# Patient Record
Sex: MALE | Race: WHITE | NOT HISPANIC OR LATINO | Employment: FULL TIME | URBAN - METROPOLITAN AREA
[De-identification: names, ages, dates, MRNs, and addresses within clinical notes are randomized per-mention and may not be internally consistent; named-entity substitution may affect disease eponyms.]

---

## 2017-11-27 ENCOUNTER — ALLIED HEALTH/NURSE VISIT (OUTPATIENT)
Dept: NURSING | Facility: CLINIC | Age: 32
End: 2017-11-27
Payer: COMMERCIAL

## 2017-11-27 DIAGNOSIS — Z23 NEED FOR PROPHYLACTIC VACCINATION AND INOCULATION AGAINST INFLUENZA: Primary | ICD-10-CM

## 2017-11-27 PROCEDURE — 90686 IIV4 VACC NO PRSV 0.5 ML IM: CPT

## 2017-11-27 PROCEDURE — 90471 IMMUNIZATION ADMIN: CPT

## 2017-11-27 PROCEDURE — 99207 ZZC NO CHARGE NURSE ONLY: CPT

## 2017-11-27 NOTE — PROGRESS NOTES

## 2017-11-27 NOTE — MR AVS SNAPSHOT
"              After Visit Summary   2017    Wiliam Kellogg    MRN: [id number]           Patient Information     Date Of Birth          1985        Visit Information        Provider Department      2017 4:00 PM CR SILVER MA/LPN [facility]        Today's Diagnoses     Need for prophylactic vaccination and inoculation against influenza    -  1       Follow-ups after your visit        Who to contact     If you have questions or need follow up information about today's clinic visit or your schedule please contact [facility] directly at [phone].  Normal or non-critical lab and imaging results will be communicated to you by Netgenhart, letter or phone within 4 business days after the clinic has received the results. If you do not hear from us within 7 days, please contact the clinic through [employer] or phone. If you have a critical or abnormal lab result, we will notify you by phone as soon as possible.  Submit refill requests through Loom or call your pharmacy and they will forward the refill request to us. Please allow 3 business days for your refill to be completed.          Additional Information About Your Visit        MyChart Information     Loom lets you send messages to your doctor, view your test results, renew your prescriptions, schedule appointments and more. To sign up, go to [email or web address] . Click on \"Log in\" on the left side of the screen, which will take you to the Welcome page. Then click on \"Sign up Now\" on the right side of the page.     You will be asked to enter the access code listed below, as well as some personal information. Please follow the directions to create your username and password.     Your access code is: [email or web address]  Expires: 2018  4:14 PM     Your access code will  in 90 days. If you need help or a new code, please call your [facility] or [phone].        Care EveryWhere ID     This is your Care " EveryWhere ID. This could be used by other organizations to access your [place] medical records  [id number]         Blood Pressure from Last 3 Encounters:   06/23/16 120/78   05/25/16 136/88   05/24/16 139/85    Weight from Last 3 Encounters:   06/23/16 203 lb (92.1 kg)   05/25/16 203 lb (92.1 kg)   05/24/16 203 lb 6.4 oz (92.3 kg)              We Performed the Following     FLU VAC, SPLIT VIRUS IM > 3 YO (QUADRIVALENT) [97251]     Vaccine Administration, Initial [[id number]]        Primary Care Provider Office Phone # Fax #    Louann Shaheed Roldan [phone] [id number]  [facility] [id number]        Equal Access to Services     ALONSO PELAYO : Hadii ajit matoso Soclair, waaxda luqadaha, qaybta kaalmada adeegyada, li campoverde . So [facility] [phone].    ATENCIÓN: Si habla español, tiene a khan disposición servicios gratuitos de asistencia lingüística. Llame al [phone].    We comply with applicable federal civil rights laws and [place] laws. We do not discriminate on the basis of race, color, national origin, age, disability, sex, sexual orientation, or gender identity.            Thank you!     Thank you for choosing [facility]  for your care. Our goal is always to provide you with excellent care. Hearing back from our patients is one way we can continue to improve our services. Please take a few minutes to complete the written survey that you may receive in the mail after your visit with us. Thank you!             Your Updated Medication List - Protect others around you: Learn how to safely use, store and throw away your medicines at www.disposemymeds.org.          This list is accurate as of: 11/27/17  4:14 PM.  Always use your most recent med list.                   Brand Name Dispense Instructions for use Diagnosis    ketoconazole 2 % cream    NIZORAL    30 g    Apply topically 2 times daily    Seborrheic dermatitis        sulfamethoxazole-trimethoprim 800-160 MG per tablet    BACTRIM DS/SEPTRA DS    60 tablet    Take 1 tablet by mouth 2 times daily    Acne vulgaris       tretinoin 0.05 % cream    RETIN-A    45 g    Apply topically At Bedtime    Acne vulgaris

## 2019-01-25 ENCOUNTER — ALLIED HEALTH/NURSE VISIT (OUTPATIENT)
Dept: NURSING | Facility: CLINIC | Age: 34
End: 2019-01-25
Payer: COMMERCIAL

## 2019-01-25 DIAGNOSIS — Z23 NEED FOR PROPHYLACTIC VACCINATION AND INOCULATION AGAINST INFLUENZA: Primary | ICD-10-CM

## 2019-01-25 PROCEDURE — 90471 IMMUNIZATION ADMIN: CPT

## 2019-01-25 PROCEDURE — 99207 ZZC NO CHARGE NURSE ONLY: CPT

## 2019-01-25 PROCEDURE — 90686 IIV4 VACC NO PRSV 0.5 ML IM: CPT

## 2019-01-25 NOTE — PROGRESS NOTES

## 2019-11-04 ENCOUNTER — ALLIED HEALTH/NURSE VISIT (OUTPATIENT)
Dept: FAMILY MEDICINE | Facility: CLINIC | Age: 34
End: 2019-11-04
Payer: COMMERCIAL

## 2019-11-04 DIAGNOSIS — Z23 NEED FOR PROPHYLACTIC VACCINATION AND INOCULATION AGAINST INFLUENZA: Primary | ICD-10-CM

## 2019-11-04 PROCEDURE — 90686 IIV4 VACC NO PRSV 0.5 ML IM: CPT

## 2019-11-04 PROCEDURE — 99207 ZZC NO CHARGE NURSE ONLY: CPT

## 2019-11-04 PROCEDURE — 90471 IMMUNIZATION ADMIN: CPT

## 2020-06-18 ENCOUNTER — OFFICE VISIT (OUTPATIENT)
Dept: FAMILY MEDICINE | Facility: CLINIC | Age: 35
End: 2020-06-18
Payer: COMMERCIAL

## 2020-06-18 VITALS
WEIGHT: 205 LBS | OXYGEN SATURATION: 98 % | HEART RATE: 80 BPM | DIASTOLIC BLOOD PRESSURE: 82 MMHG | SYSTOLIC BLOOD PRESSURE: 155 MMHG | RESPIRATION RATE: 16 BRPM | TEMPERATURE: 97.9 F | BODY MASS INDEX: 29.41 KG/M2
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
## 2020-06-18 PROCEDURE — 99203 OFFICE O/P NEW LOW 30 MIN: CPT | Performed by: PHYSICIAN ASSISTANT

## 2020-06-18 NOTE — PROGRESS NOTES
Subjective     Wiliam Kellogg is a 34 year old male who presents to clinic today for the following health issues:    HPI   Concern - Tuesday night- cat bite  Onset: 3 days     Description:   Swollen, red, right hand- one puncture wound. It is his cat who is an indoor cat and up to date on vaccines.     Intensity: 1/10    Progression of Symptoms: worsening- redness is expanding.     Previous history of similar problem:   Yes     Alleviating factors:  Improved by: peroxide      Patient Active Problem List   Diagnosis     CARDIOVASCULAR SCREENING; LDL GOAL LESS THAN 160     Acne vulgaris     History of isotretinoin therapy     Past Surgical History:   Procedure Laterality Date     C ANESTH,REPAIR UP HERNIA OMPHALOCELE  1988     C REPAIR NONUNION RADIUS AND ULNA  1991    Frx due to trauma, ORIF ulna and radius L arm     wisdom teeth extraction         Social History     Tobacco Use     Smoking status: Never Smoker     Smokeless tobacco: Never Used   Substance Use Topics     Alcohol use: Yes     Comment: occ     Family History   Problem Relation Age of Onset     Neurologic Disorder Mother         migraines     Family History Negative Father      Family History Negative Sister          Current Outpatient Medications   Medication Sig Dispense Refill     amoxicillin-clavulanate (AUGMENTIN) 875-125 MG tablet Take 1 tablet by mouth 2 times daily for 10 days 20 tablet 0     ketoconazole (NIZORAL) 2 % cream Apply topically 2 times daily 30 g 1     sulfamethoxazole-trimethoprim (BACTRIM DS,SEPTRA DS) 800-160 MG per tablet Take 1 tablet by mouth 2 times daily 60 tablet 3     tretinoin (RETIN-A) 0.05 % cream Apply topically At Bedtime 45 g 3     Allergies   Allergen Reactions     No Known Drug Allergies        Reviewed and updated as needed this visit by Provider         Review of Systems   Constitutional, HEENT, cardiovascular, pulmonary, gi and gu systems are negative, except as otherwise noted.        Objective    BP (!)  155/82 (BP Location: Right arm, Patient Position: Sitting, Cuff Size: Adult Large)   Pulse 80   Temp 97.9  F (36.6  C) (Oral)   Resp 16   Wt 93 kg (205 lb)   SpO2 98%   BMI 29.41 kg/m    Body mass index is 29.41 kg/m .         Physical Exam   GENERAL: healthy, alert and no distress  EYES: Eyes grossly normal to inspection, PERRL and conjunctivae and sclerae normal  MS: no gross musculoskeletal defects noted, no edema  SKIN: no suspicious lesions or rashes  NEURO: Normal strength and tone, mentation intact and speech normal  PSYCH: mentation appears normal, affect normal/bright  Right hand: There is a single puncture wound on dorsal side of the hand near distal 1st metacarpal. There is mild swelling in this area. Surrounding erythema is present and about 5-6 cm in diameter. Tender to palpation surrounding the puncture wound.    Diagnostic Test Results:  none         Assessment & Plan     (W55.01XA) Cat bite, initial encounter  (primary encounter diagnosis)    Comment: Infected. Treat with augmentin. Monitor closely and if worsening, call the clinic right away.    Plan: amoxicillin-clavulanate (AUGMENTIN) 875-125 MG         tablet                 Patient Instructions     Patient Education     Cat Bite    A cat bite can cause a wound deep enough to break the skin. In such cases, the wound is cleaned and then sometimes closed. If the wound is closed it is usually not closed completely. This is so that fluid can drain if the wound becomes infected. Often the wound is left open to heal. In addition to wound care, a tetanus shot may be given, if needed.  Home care    Wash your hands well with soap and warm water before and after caring for the wound. This helps lower the risk of infection.    Care for the wound as directed. If a dressing was applied to the wound, be sure to change it as directed.    If the wound bleeds, place a clean, soft cloth on the wound. Then firmly apply pressure until the bleeding stops. This  may take up to 5 minutes. Don't release the pressure and look at the wound during this time.    Always get medical attention for cat bites on the hand. They are highly likely to become infected.    Most wounds heal within 10 days. But an infection can occur even with proper treatment. So be sure to check the wound daily for signs of infection (see below).    Antibiotics may be prescribed. These help prevent or treat infection. If you re given antibiotics, take them as directed. Also be sure to complete the medicines.  Rabies prevention  Rabies is a virus that can be carried in certain animals. These can include domestic animals such as cats and dogs. Pets fully vaccinated against rabies (2 shots) are at very low risk of infection. But because human rabies is almost always fatal, any biting pet should be confined for 10 days as an extra precaution. In general, if there is a risk for rabies, the following steps may need to be taken:    If someone s pet cat has bitten you, it should be kept in a secure area for the next 10 days to watch for signs of illness. If the pet owner won t allow this, contact your local animal control center. If the cat becomes ill or dies during that time, contact your local animal control center at once so the animal may be tested for rabies. If the cat stays healthy for the next 10 days, there is no danger of rabies in the animal or you.    If a stray cat bit you, contact your local animal control center. They can give information on capture, quarantine, and animal rabies testing.    If you can t find the animal that bit you in the next 2 days, and if rabies exists in your area, you may need to receive the rabies vaccine series. Call your healthcare provider right away. Or return to the emergency department promptly.    All animal bites should be reported to the local animal control center. If you were not given a form to fill out, you can report this yourself.  Follow-up care  Follow up  with your healthcare provider, or as directed.  When to seek medical advice  Call your healthcare provider right away if any of these occur:    Signs of infection:  ? Spreading redness or warmth from the wound  ? Increased pain or swelling  ? Fever of 100.4 F (38 C) or higher, or as directed by your healthcare provider  ? Colored fluid or pus draining from the wound  ? Enlarged lymph nodes above the area that was bitten, such as lymph nodes in the armpit if you were bitten on the hand or arm. This may be a sign of cat-scratch disease (cat-scratch fever).    Signs of rabies infection:  ? Headache  ? Confusion  ? Strange behavior  ? Increased salivating or drooling  ? Seizure    Decreased ability to move any body part near the bite area    Bleeding that can't be stopped after 5 minutes of firm pressure  Date Last Reviewed: 5/1/2018 2000-2019 [employer] [address] [place] [address], [place] [address]. All rights reserved. This information is not intended as a substitute for professional medical care. Always follow your healthcare professional's instructions.               No follow-ups on file.    Alonzo Mccallum PA-C  [facility]

## 2020-06-18 NOTE — PATIENT INSTRUCTIONS
Patient Education     Cat Bite    A cat bite can cause a wound deep enough to break the skin. In such cases, the wound is cleaned and then sometimes closed. If the wound is closed it is usually not closed completely. This is so that fluid can drain if the wound becomes infected. Often the wound is left open to heal. In addition to wound care, a tetanus shot may be given, if needed.  Home care    Wash your hands well with soap and warm water before and after caring for the wound. This helps lower the risk of infection.    Care for the wound as directed. If a dressing was applied to the wound, be sure to change it as directed.    If the wound bleeds, place a clean, soft cloth on the wound. Then firmly apply pressure until the bleeding stops. This may take up to 5 minutes. Don't release the pressure and look at the wound during this time.    Always get medical attention for cat bites on the hand. They are highly likely to become infected.    Most wounds heal within 10 days. But an infection can occur even with proper treatment. So be sure to check the wound daily for signs of infection (see below).    Antibiotics may be prescribed. These help prevent or treat infection. If you re given antibiotics, take them as directed. Also be sure to complete the medicines.  Rabies prevention  Rabies is a virus that can be carried in certain animals. These can include domestic animals such as cats and dogs. Pets fully vaccinated against rabies (2 shots) are at very low risk of infection. But because human rabies is almost always fatal, any biting pet should be confined for 10 days as an extra precaution. In general, if there is a risk for rabies, the following steps may need to be taken:    If someone s pet cat has bitten you, it should be kept in a secure area for the next 10 days to watch for signs of illness. If the pet owner won t allow this, contact your local animal control center. If the cat becomes ill or dies during that  time, contact your local animal control center at once so the animal may be tested for rabies. If the cat stays healthy for the next 10 days, there is no danger of rabies in the animal or you.    If a stray cat bit you, contact your local animal control center. They can give information on capture, quarantine, and animal rabies testing.    If you can t find the animal that bit you in the next 2 days, and if rabies exists in your area, you may need to receive the rabies vaccine series. Call your healthcare provider right away. Or return to the emergency department promptly.    All animal bites should be reported to the local animal control center. If you were not given a form to fill out, you can report this yourself.  Follow-up care  Follow up with your healthcare provider, or as directed.  When to seek medical advice  Call your healthcare provider right away if any of these occur:    Signs of infection:  ? Spreading redness or warmth from the wound  ? Increased pain or swelling  ? Fever of 100.4 F (38 C) or higher, or as directed by your healthcare provider  ? Colored fluid or pus draining from the wound  ? Enlarged lymph nodes above the area that was bitten, such as lymph nodes in the armpit if you were bitten on the hand or arm. This may be a sign of cat-scratch disease (cat-scratch fever).    Signs of rabies infection:  ? Headache  ? Confusion  ? Strange behavior  ? Increased salivating or drooling  ? Seizure    Decreased ability to move any body part near the bite area    Bleeding that can't be stopped after 5 minutes of firm pressure  Date Last Reviewed: 5/1/2018 2000-2019 [employer]. [address] [place] [address], [place] [address]. All rights reserved. This information is not intended as a substitute for professional medical care. Always follow your healthcare professional's instructions.

## 2023-11-30 ENCOUNTER — OFFICE VISIT (OUTPATIENT)
Dept: PEDIATRICS | Facility: CLINIC | Age: 38
End: 2023-11-30
Payer: COMMERCIAL

## 2023-11-30 VITALS
WEIGHT: 215.9 LBS | SYSTOLIC BLOOD PRESSURE: 112 MMHG | RESPIRATION RATE: 16 BRPM | BODY MASS INDEX: 30.91 KG/M2 | HEART RATE: 56 BPM | DIASTOLIC BLOOD PRESSURE: 68 MMHG | HEIGHT: 70 IN | OXYGEN SATURATION: 99 % | TEMPERATURE: 98.3 F

## 2023-11-30 DIAGNOSIS — M25.511 CHRONIC RIGHT SHOULDER PAIN: Primary | ICD-10-CM

## 2023-11-30 DIAGNOSIS — G89.29 CHRONIC RIGHT SHOULDER PAIN: Primary | ICD-10-CM

## 2023-11-30 PROCEDURE — 99204 OFFICE O/P NEW MOD 45 MIN: CPT | Performed by: PHYSICIAN ASSISTANT

## 2023-11-30 ASSESSMENT — PAIN SCALES - GENERAL: PAINLEVEL: MILD PAIN (2)

## 2023-11-30 NOTE — PROGRESS NOTES
"  Assessment & Plan     Chronic right shoulder pain  Chronic shoulder pain worsening, possible lifting injury 10 years ago. Failed PT.   Starting to effects ADLS. MRI ordered. Possible ortho referral pending results. Activity as tolerated.    - MR Shoulder Right w/o Contrast               BMI:   Estimated body mass index is 30.98 kg/m  as calculated from the following:    Height as of this encounter: 1.778 m (5' 10\").    Weight as of this encounter: 97.9 kg (215 lb 14.4 oz).           [facility] JAROD Swain is a 38 year old, presenting for the following health issues:  Shoulder Pain        11/30/2023    10:57 AM   Additional Questions   Roomed by Georgia Quan CMA       Shoulder Pain    History of Present Illness       Reason for visit:  Shoulder    He eats 2-3 servings of fruits and vegetables daily.He consumes 0 sweetened beverage(s) daily.He exercises with enough effort to increase his heart rate 60 or more minutes per day.  He exercises with enough effort to increase his heart rate 7 days per week.   He is taking medications regularly.     Shoulder pain for years - right. Has tried chiropractor, PT  States it \"clicks\", has lack of mobility in joint  Wondering about MRI            1. Right shoulder injury, maybe 10 years ago or so, happened at Gym. Took some time off from working out. Did not get medical care. Would just compensate for the pain. Has noted decrease arom. . Click, clunk sensation. Tried PT this summer.     Review of Systems         Objective    /68 (BP Location: Right arm, Cuff Size: Adult Large)   Pulse 56   Temp 98.3  F (36.8  C) (Tympanic)   Resp 16   Ht 1.778 m (5' 10\")   Wt 97.9 kg (215 lb 14.4 oz)   SpO2 99%   BMI 30.98 kg/m    Body mass index is 30.98 kg/m .  Physical Exam   GENERAL: healthy, alert and no distress  MS: right shoulder. No ttp. Strength 5/5. Sensation intact. Neg drop arm. Pos Rangel and Neers testing. "

## 2023-12-05 ENCOUNTER — HOSPITAL ENCOUNTER (OUTPATIENT)
Dept: MRI IMAGING | Facility: CLINIC | Age: 38
Discharge: HOME | End: 2023-12-05
Attending: PHYSICIAN ASSISTANT | Admitting: PHYSICIAN ASSISTANT
Payer: COMMERCIAL

## 2023-12-05 DIAGNOSIS — G89.29 CHRONIC RIGHT SHOULDER PAIN: ICD-10-CM

## 2023-12-05 DIAGNOSIS — M25.511 CHRONIC RIGHT SHOULDER PAIN: ICD-10-CM

## 2023-12-05 PROCEDURE — 73221 MRI JOINT UPR EXTREM W/O DYE: CPT | Mod: RT

## 2023-12-05 PROCEDURE — 73221 MRI JOINT UPR EXTREM W/O DYE: CPT | Mod: 26 | Performed by: RADIOLOGY

## 2023-12-12 ENCOUNTER — OFFICE VISIT (OUTPATIENT)
Dept: ORTHOPEDICS | Facility: CLINIC | Age: 38
End: 2023-12-12
Payer: COMMERCIAL

## 2023-12-12 VITALS
HEART RATE: 53 BPM | WEIGHT: 210 LBS | SYSTOLIC BLOOD PRESSURE: 152 MMHG | DIASTOLIC BLOOD PRESSURE: 84 MMHG | BODY MASS INDEX: 30.06 KG/M2 | HEIGHT: 70 IN

## 2023-12-12 DIAGNOSIS — M19.011 ARTHRITIS OF RIGHT ACROMIOCLAVICULAR JOINT: ICD-10-CM

## 2023-12-12 DIAGNOSIS — M25.511 CHRONIC RIGHT SHOULDER PAIN: ICD-10-CM

## 2023-12-12 DIAGNOSIS — M19.011 OSTEOARTHRITIS OF GLENOHUMERAL JOINT, RIGHT: Primary | ICD-10-CM

## 2023-12-12 DIAGNOSIS — G89.29 CHRONIC RIGHT SHOULDER PAIN: ICD-10-CM

## 2023-12-12 PROCEDURE — 99204 OFFICE O/P NEW MOD 45 MIN: CPT | Performed by: STUDENT IN AN ORGANIZED HEALTH CARE EDUCATION/TRAINING PROGRAM

## 2023-12-12 NOTE — LETTER
12/12/2023         RE: Wiliam Kellogg  [address]  [facility] [id number]        Dear Colleague,    Thank you for referring your patient, Wiliam Kellogg, to the [facility] [place]. Please see a copy of my visit note below.    ASSESSMENT & PLAN    Wiliam was seen today for pain.    Diagnoses and all orders for this visit:    Osteoarthritis of glenohumeral joint, right  -     Physical Therapy Referral; Future    Arthritis of right acromioclavicular joint        Wiliam presents to clinic today to discuss his chronic right shoulder pain.  His MRI was reviewed today and was significant for moderate osteoarthritis of the glenohumeral joint with spurring of the inferior medial humeral head, and associated degenerative labral pathology.  Reassuringly, he has no large tearing of the rotator cuff.  There was also synovitis within the shoulder joint and biceps tendon sheath, and I suspect this is due to his osteoarthritis as well rather than any systemic inflammatory process as he has no associated systemic symptoms and no associated warmth or swelling of the shoulder.  On exam today, he does have full strength of the rotator cuff and has normal range of motion in flexion and abduction of the shoulder with limitations in internal and external rotation that I anticipate are due to his glenohumeral arthritis.  We discussed the above findings and the possible treatment options including physical therapy, corticosteroid injections, and surgical intervention.  Given his age, I would be cautious in performing multiple steroid injections as this could lead to worsening his arthritis in the future, and we will hold off on this at this time.  Should we decide to pursue injections as part of his treatment plan, he could consider a diagnostic ropivacaine injection and assess his range of motion afterwards to see if his limitations in range of motion are structural or pain based.  He could  "also be a candidate for intra-articular PRP into the shoulder joint for pain relief without the contra toxic effects of steroids.  We discussed the following plan:  -Physical therapy referral placed today  -Will discuss his MRI findings with the orthopedic surgery team to determine if there is a role for surgical intervention  -He can continue to use over-the-counter pain medications, ice, heat as needed  -He will follow-up in 4 to 6 weeks if not improving, or sooner if unable to tolerate PT.  At that time we will proceed with procedural intervention as above      Ludwig Hearn [facility] [place]    -----  Chief Complaint   Patient presents with     Right Shoulder - Pain       SUBJECTIVE  Wiliam Kellogg is a/an 38 year old male who is seen as a self referral for evaluation of chronic right shoulder pain.     The patient is seen by themselves.  The patient is Right handed    Onset: ~10 years(s) ago. Patient describes injury as possible gym injury, felt a pop in the shoulder while lifting, took a short break and returned to the gym but noticed differences.  Location of Pain: right shoulder joint, superior shoulder  Worsened by: certain overhead motions, external rotation  Better with: all may have helped but no significant change  Treatments tried: chiropractic, MRI, stretching, lifting/exercise  Associated symptoms: no distal numbness or tingling; denies swelling or warmth, pt does report an occasional locking or catching towards superior deltoid, pops and clicks, loss of ROM, loss of strength in shoulder (throwing)    Orthopedic/Surgical history: NO  Social History/Occupation: , plays softball, j[employer]      REVIEW OF SYSTEMS:  Review of systems negative unless mentioned in HPI     OBJECTIVE:  BP (!) 152/84   Pulse 53   Ht 1.778 m (5' 10\")   Wt 95.3 kg (210 lb)   BMI 30.13 kg/m     General: healthy, alert and in no distress  Skin: no suspicious lesions or rash.  CV: " distal perfusion intact   Resp: normal respiratory effort without conversational dyspnea   Psych: normal mood and affect  Gait: NORMAL  Neuro: Normal light sensory exam of RU extremity     Shoulder Examination (focused):   Left  Right     Skin intact intact   Inspection No erythema, ecchymosis  No erythema, ecchymosis    Palpation Tenderness: None Tenderness: None   Range of Motion (active) Forward flexion:175   GH Abduction: 90  Reach behind back: Between scapula  Forward flexion:175   GH Abduction: 90  Reach behind back: L5   Range of Motion (passive) Forward flexion:175   GH Abduction: 90  ER at side: 70  ER at 90 deg abduction: 90  IR at 90 deg abduction: 60 Forward flexion:175   GH Abduction: 90  ER at side: 60  ER at 90 deg abduction: 70  IR at 90 deg abduction: 40   Crepitus No present   Strength Internal Rotation at side: 5+  External Rotation at side: 5+  Belly Press: 5+ Internal Rotation at side: 5+  External Rotation at side: 5+  Belly Press: 5+    Special Tests Rangel: NP  Neer: NP  Francis: 5+  Speed's: 5+  Cross arm: Negative  Rangel: NP  Neer: NP  Francis: 5+  Speed's: 5+  Cross arm: Negative      Other Positive Tests/ Notable Findings O'pita's: NP  Bicep Load I/II: NP  Internal Impingement: NP  Yergason: NP   ERLS: NP  Hornblower: NP  Bear Hug: NP O'pita's: NP  Bicep Load I/II: NP  Internal Impingement: NP  Yergason: NP   ERLS: NP  Hornblower: NP  Bear Hug: NP   Instability Generalized laxity: no  Anterior apprehension: Negative  Load and shift (anterior): NP  Load and shift (posterior): NP Generalized laxity: no  Anterior apprehension: Negative  Load and shift (anterior): NP  Load and shift (posterior): NP   Neurologic No abnormal sensation.   Scapular Motion Normal scapular alignment bilaterally without notable protraction/retraction, elevation/depression  No dynamic evidence of scapular dyskinesia           RADIOLOGY:  Final results and radiologist's interpretation, available in the [facility]  record.  Images were reviewed with the patient in the office today.  My personal interpretation of the performed imaging:     MRI of right shoulder dated 12/5/2023  IMPRESSION:  1. Mild to moderate osteoarthrosis at the right shoulder  acromioclavicular joint.  2. At least moderate osteoarthrosis in the right shoulder glenohumeral joint with osteophytic spurring along the inferomedial humeral head, joint space narrowing with high-grade cartilage loss greatest along the posterior joint, and degenerative labral tearing with attenuation of the posterior labrum.  3. Small amount of fluid in the right shoulder glenohumeral joint with marked synovitis. Additionally there is a moderate amount of fluid within the biceps tendon sheath with joint bodies and/or synovitis. Correlate clinically for inflammatory processes.   4. Tendinosis of the right shoulder supraspinatus, infraspinatus, and subscapularis tendons without full-thickness tear or tendon retraction.   5. Normal muscle bulk of the right shoulder rotator cuff musculature.   6. Normal-appearing biceps tendon.        Again, thank you for allowing me to participate in the care of your patient.        Sincerely,        Ludwig Hearn, DO

## 2023-12-12 NOTE — PROGRESS NOTES
ASSESSMENT & PLAN    Wiliam was seen today for pain.    Diagnoses and all orders for this visit:    Osteoarthritis of glenohumeral joint, right  -     Physical Therapy Referral; Future    Arthritis of right acromioclavicular joint        Wiliam presents to clinic today to discuss his chronic right shoulder pain.  His MRI was reviewed today and was significant for moderate osteoarthritis of the glenohumeral joint with spurring of the inferior medial humeral head, and associated degenerative labral pathology.  Reassuringly, he has no large tearing of the rotator cuff.  There was also synovitis within the shoulder joint and biceps tendon sheath, and I suspect this is due to his osteoarthritis as well rather than any systemic inflammatory process as he has no associated systemic symptoms and no associated warmth or swelling of the shoulder.  On exam today, he does have full strength of the rotator cuff and has normal range of motion in flexion and abduction of the shoulder with limitations in internal and external rotation that I anticipate are due to his glenohumeral arthritis.  We discussed the above findings and the possible treatment options including physical therapy, corticosteroid injections, and surgical intervention.  Given his age, I would be cautious in performing multiple steroid injections as this could lead to worsening his arthritis in the future, and we will hold off on this at this time.  Should we decide to pursue injections as part of his treatment plan, he could consider a diagnostic ropivacaine injection and assess his range of motion afterwards to see if his limitations in range of motion are structural or pain based.  He could also be a candidate for intra-articular PRP into the shoulder joint for pain relief without the contra toxic effects of steroids.  We discussed the following plan:  -Physical therapy referral placed today  -Will discuss his MRI findings with the orthopedic surgery team to  "determine if there is a role for surgical intervention  -He can continue to use over-the-counter pain medications, ice, heat as needed  -He will follow-up in 4 to 6 weeks if not improving, or sooner if unable to tolerate PT.  At that time we will proceed with procedural intervention as above      DO SERNEA Argueta [facility]    -----  Chief Complaint   Patient presents with    Right Shoulder - Pain       SUBJECTIVE  Wiliam Kellogg is a/an 38 year old male who is seen as a self referral for evaluation of chronic right shoulder pain.     The patient is seen by themselves.  The patient is Right handed    Onset: ~10 years(s) ago. Patient describes injury as possible gym injury, felt a pop in the shoulder while lifting, took a short break and returned to the gym but noticed differences.  Location of Pain: right shoulder joint, superior shoulder  Worsened by: certain overhead motions, external rotation  Better with: all may have helped but no significant change  Treatments tried: chiropractic, MRI, stretching, lifting/exercise  Associated symptoms: no distal numbness or tingling; denies swelling or warmth, pt does report an occasional locking or catching towards superior deltoid, pops and clicks, loss of ROM, loss of strength in shoulder (throwing)    Orthopedic/Surgical history: NO  Social History/Occupation: , plays softball, jiu [employer]      REVIEW OF SYSTEMS:  Review of systems negative unless mentioned in HPI     OBJECTIVE:  BP (!) 152/84   Pulse 53   Ht 1.778 m (5' 10\")   Wt 95.3 kg (210 lb)   BMI 30.13 kg/m     General: healthy, alert and in no distress  Skin: no suspicious lesions or rash.  CV: distal perfusion intact   Resp: normal respiratory effort without conversational dyspnea   Psych: normal mood and affect  Gait: NORMAL  Neuro: Normal light sensory exam of RU extremity     Shoulder Examination (focused):   Left  Right     Skin intact intact   Inspection No " erythema, ecchymosis  No erythema, ecchymosis    Palpation Tenderness: None Tenderness: None   Range of Motion (active) Forward flexion:175   GH Abduction: 90  Reach behind back: Between scapula  Forward flexion:175   GH Abduction: 90  Reach behind back: L5   Range of Motion (passive) Forward flexion:175   GH Abduction: 90  ER at side: 70  ER at 90 deg abduction: 90  IR at 90 deg abduction: 60 Forward flexion:175   GH Abduction: 90  ER at side: 60  ER at 90 deg abduction: 70  IR at 90 deg abduction: 40   Crepitus No present   Strength Internal Rotation at side: 5+  External Rotation at side: 5+  Belly Press: 5+ Internal Rotation at side: 5+  External Rotation at side: 5+  Belly Press: 5+    Special Tests Rangel: NP  Neer: NP  Francis: 5+  Speed's: 5+  Cross arm: Negative  Rangel: NP  Neer: NP  Francis: 5+  Speed's: 5+  Cross arm: Negative      Other Positive Tests/ Notable Findings O'pita's: NP  Bicep Load I/II: NP  Internal Impingement: NP  Yergason: NP   ERLS: NP  Hornblower: NP  Bear Hug: NP O'pita's: NP  Bicep Load I/II: NP  Internal Impingement: NP  Yergason: NP   ERLS: NP  Hornblower: NP  Bear Hug: NP   Instability Generalized laxity: no  Anterior apprehension: Negative  Load and shift (anterior): NP  Load and shift (posterior): NP Generalized laxity: no  Anterior apprehension: Negative  Load and shift (anterior): NP  Load and shift (posterior): NP   Neurologic No abnormal sensation.   Scapular Motion Normal scapular alignment bilaterally without notable protraction/retraction, elevation/depression  No dynamic evidence of scapular dyskinesia           RADIOLOGY:  Final results and radiologist's interpretation, available in the [facility] health record.  Images were reviewed with the patient in the office today.  My personal interpretation of the performed imaging:     MRI of right shoulder dated 12/5/2023  IMPRESSION:  1. Mild to moderate osteoarthrosis at the right shoulder  acromioclavicular joint.  2. At least  moderate osteoarthrosis in the right shoulder glenohumeral joint with osteophytic spurring along the inferomedial humeral head, joint space narrowing with high-grade cartilage loss greatest along the posterior joint, and degenerative labral tearing with attenuation of the posterior labrum.  3. Small amount of fluid in the right shoulder glenohumeral joint with marked synovitis. Additionally there is a moderate amount of fluid within the biceps tendon sheath with joint bodies and/or synovitis. Correlate clinically for inflammatory processes.   4. Tendinosis of the right shoulder supraspinatus, infraspinatus, and subscapularis tendons without full-thickness tear or tendon retraction.   5. Normal muscle bulk of the right shoulder rotator cuff musculature.   6. Normal-appearing biceps tendon.

## 2023-12-13 ENCOUNTER — THERAPY VISIT (OUTPATIENT)
Dept: PHYSICAL THERAPY | Facility: CLINIC | Age: 38
End: 2023-12-13
Attending: STUDENT IN AN ORGANIZED HEALTH CARE EDUCATION/TRAINING PROGRAM
Payer: COMMERCIAL

## 2023-12-13 DIAGNOSIS — M19.011 OSTEOARTHRITIS OF GLENOHUMERAL JOINT, RIGHT: ICD-10-CM

## 2023-12-13 PROCEDURE — 97110 THERAPEUTIC EXERCISES: CPT | Mod: GP | Performed by: PHYSICAL THERAPIST

## 2023-12-13 PROCEDURE — 97161 PT EVAL LOW COMPLEX 20 MIN: CPT | Mod: GP | Performed by: PHYSICAL THERAPIST

## 2023-12-13 NOTE — PROGRESS NOTES
PHYSICAL THERAPY EVALUATION  Type of Visit: Evaluation    See electronic medical record for Abuse and Falls Screening details.    Subjective       Presenting condition or subjective complaint: R shoulder pain, decreased ROM, OA  Date of onset:      Relevant medical history:     Dates & types of surgery:      Prior diagnostic imaging/testing results: MRI     Prior therapy history for the same diagnosis, illness or injury: Yes Private PT over summer in [place]    Prior Level of Function  Transfers: Independent  Ambulation: Independent  ADL: Independent    Living Environment  Social support:     Type of home:     Stairs to enter the home:         Ramp:     Stairs inside the home:         Help at home:    Equipment owned:       Employment:   Owner of window cleaning company  Hobbies/Interests: Softball, martial arts, weight lifting    Patient goals for therapy: Reach behind back, softball, lifting    Pain assessment: Location: posterior shoulder/Rating: current no pain but tight,      Objective   SHOULDER:    Standing Posture: forward shoulders        T-Spine Mobility:  mod loss EXT, R Rot=min to mod L Rot=min loss    Shoulder shrug   Retraction     Shoulder: (* indicates patient's pain)   PROM L PROM R AROM L AROM R MMT L MMT R   Flex   165 155 (     Abd   178 162     IR         ER   55 50     Ext/IR   T3 T11              Assessment & Plan   CLINICAL IMPRESSIONS  Medical Diagnosis:      Treatment Diagnosis:     Impression/Assessment: Patient is a 38 year old male with R osteoarthritis of glenohumeral joint complaints.  The following significant findings have been identified: Pain, Decreased ROM/flexibility, Decreased joint mobility, Decreased strength, Inflammation, Impaired muscle performance, and Decreased activity tolerance. These impairments interfere with their ability to perform self care tasks, work tasks, recreational activities, household chores, driving , household mobility, and community mobility as  compared to previous level of function.     Clinical Decision Making (Complexity):  Clinical Presentation: Stable/Uncomplicated  Clinical Presentation Rationale: based on medical and personal factors listed in PT evaluation  Clinical Decision Making (Complexity): Low complexity    PLAN OF CARE  Treatment Interventions:  Interventions: Neuromuscular Re-education, Therapeutic Activity, Therapeutic Exercise    Long Term Goals     PT Goal 1  Goal Identifier: Goal 1  Goal Description: Pt will be able to reach overhead pain free.  Rationale: to maximize safety and independence with performance of ADLs and functional tasks      Frequency of Treatment:    Duration of Treatment:        Education Assessment:   Learner/Method: Patient;Pictures/Video    Risks and benefits of evaluation/treatment have been explained.   Patient/Family/caregiver agrees with Plan of Care.     Evaluation Time:           Signing Clinician: Jyoti Luna PT

## 2024-01-19 ENCOUNTER — E-VISIT (OUTPATIENT)
Dept: URGENT CARE | Facility: CLINIC | Age: 39
End: 2024-01-19
Payer: COMMERCIAL

## 2024-01-19 DIAGNOSIS — B30.9 VIRAL CONJUNCTIVITIS: Primary | ICD-10-CM

## 2024-01-19 PROCEDURE — 99421 OL DIG E/M SVC 5-10 MIN: CPT | Performed by: PHYSICIAN ASSISTANT

## 2024-01-19 RX ORDER — OFLOXACIN 3 MG/ML
SOLUTION/ DROPS OPHTHALMIC
Qty: 5 ML | Refills: 0 | Status: SHIPPED | OUTPATIENT
Start: 2024-01-19 | End: 2024-01-26

## 2024-01-19 NOTE — PATIENT INSTRUCTIONS
Wiliam Kellogg,    Your photo and description of symptoms are consistent with pink eye caused by a virus. This can cause redness, irritation and itching in your eye as well as tearing and intermediate thickened discharge. Your eyes may even be stuck shut when when you wake up in the morning. Your eyelids may also become swollen. You'll be contagious while you have tearing and crusting in your eyes, generally 7-10 days. Wash your hands frequently and avoid touching your eyes to prevent spreading to others. You may use over the counter lubricating eye drops to help ease your symptoms. Allergy eye drops such as Patanol (olopatadine) or Zaditor (ketotifen) will help to control the itching. Avoid redness reducing eye drops for an extended period of time as these can cause a rebound and make the redness and irritation return when you stop using the drops. Cool packs on your eyes can help with irritation and swelling.     Antibiotic drops will not make a virus go away any faster and will not make you less contagious. However, if you notice that there is thick yellow or green discharge that is consistently draining from your eye (you're wiping it away every few minutes) then an antibiotic drop will help. I have sent a prescription to the pharmacy for you. Follow the instructions on the medication.    If your symptoms are getting worse or not improving by the 10th day of symptoms, be seen in person for further evaluation.    You'll be feeling better soon!    Maryjane Fu PA-C  [facility]

## 2024-01-27 ENCOUNTER — HEALTH MAINTENANCE LETTER (OUTPATIENT)
Age: 39
End: 2024-01-27

## 2024-03-29 NOTE — PROGRESS NOTES
DISCHARGE  Reason for Discharge: Patient has failed to schedule further appointments.    Equipment Issued:     Discharge Plan: Patient to continue home program.    Referring Provider:  Ludwig Hearn     12/13/23 0500   Appointment Info   Signing clinician's name / credentials Melvina Archer, SPT. Jyoti Luna, DPT, MDT certified   Total/Authorized Visits PT E and T   Visits Used 1   Medical Diagnosis Osteoarthritis of glenohumeral joint, right   PT Tx Diagnosis R GH joint OA   Progress Note/Certification   Onset of illness/injury or Date of Surgery 12/13/23   Therapy Frequency 1x/wk   Predicted Duration 8 weeks   Progress Note Due Date 02/07/24   PT Goal 1   Goal Identifier Goal 1   Goal Description Pt will be able to reach overhead pain free.   Rationale to maximize safety and independence with performance of ADLs and functional tasks   Target Date 02/07/24   Subjective Report   Subjective Report See Eval   Treatment Interventions (PT)   Interventions Therapeutic Procedure/Exercise   Therapeutic Procedure/Exercise   Therapeutic Procedures: strength, endurance, ROM, flexibility minutes (70827) 10   Ther Proc 1 sleeper stretch   Ther Proc 1 - Details x30 sec w/manual and verbal cues for form   Therapeutic Procedures Ther Proc 2   Skilled Intervention Education and instruction given to improve shoulder ROM   Patient Response/Progress pt tolerated well   Ther Proc 2 Thoracic extension stretch on foam roller   Ther Proc 2 - Details x10 reps   Eval/Assessments   PT Eval, Low Complexity Minutes (82740) 20   Education   Learner/Method Patient;Pictures/Video   Plan   Home program See PTRx   Plan for next session Reassess and progress as tolerated   Total Session Time   Timed Code Treatment Minutes 10   Total Treatment Time (sum of timed and untimed services) 30

## 2024-10-30 ENCOUNTER — VIRTUAL VISIT (OUTPATIENT)
Dept: UROLOGY | Facility: CLINIC | Age: 39
End: 2024-10-30
Payer: COMMERCIAL

## 2024-10-30 DIAGNOSIS — Z30.2 ENCOUNTER FOR STERILIZATION: Primary | ICD-10-CM

## 2024-10-30 PROCEDURE — 99203 OFFICE O/P NEW LOW 30 MIN: CPT | Mod: 95 | Performed by: UROLOGY

## 2024-10-30 NOTE — NURSING NOTE
Current patient location:  [place]    Is the patient currently in the state of [place]? YES    Visit mode:VIDEO    If the visit is dropped, the patient can be reconnected by: VIDEO VISIT: Text to cell phone:   Telephone Information:   Mobile [phone]       Will anyone else be joining the visit? NO  (If patient encounters technical issues they should call [phone]    Are changes needed to the allergy or medication list? No    Are refills needed on medications prescribed by this physician? NO    Rooming Documentation:  Questionnaire(s) completed    Reason for visit: Video Visit and RECHECK    Saige GRACIA

## 2024-10-30 NOTE — PROGRESS NOTES
VASECTOMY CONSULTATION NOTE  [facility]  [phone]  DATE OF VISIT: 10/30/2024    PATIENT NAME: Wiliam Kellogg    YOB: 1985      REASON FOR CONSULTATION: Mr. Wiliam Kellogg is a 39 year old year old gentleman who is being seen as a virtual visit in the urology clinic today requesting a vasectomy. He has 2 children and he wishes to have a vasectomy for birth control. He has no prior urologic history and has had no prior surgery on the testicles. He has no symptoms in the testicles.    PAST MEDICAL HISTORY:   Past Medical History:   Diagnosis Date    Dysphagia 3/2003    ?Zenkers, resolved sx with protonix, skipped GI referral    Enlargement of lymph nodes 3/5/2002    Neck, seen by Dr. Rebollar of [facility]... reactive & resolved    Other acne 2001    Screening for depression 10/21/2002    At [facility], believed single Major Depressive Disorder    Unspecified adjustment reaction 10/21/2002    Dx at [facility], Jonny Rodriguez, Ph.D., [facility]       PAST SURGICAL HISTORY:   Past Surgical History:   Procedure Laterality Date    wisdom teeth extraction      [email or web address] ANESTH,REPAIR UP HERNIA OMPHALOCELE  1988    [facility] [[email or web address] REPAIR NONUNION RADIUS AND ULNA  1991    Frx due to trauma, ORIF ulna and radius L arm       MEDICATIONS:   Current Outpatient Medications:     Ascorbic Acid (STEPHIE-C PO), , Disp: , Rfl:     ketoconazole (NIZORAL) 2 % cream, Apply topically 2 times daily (Patient not taking: Reported on 11/30/2023), Disp: 30 g, Rfl: 1    MAGNESIUM PO, , Disp: , Rfl:     Omega-3 Fatty Acids (FISH OIL PO), , Disp: , Rfl:     sulfamethoxazole-trimethoprim (BACTRIM DS,SEPTRA DS) 800-160 MG per tablet, Take 1 tablet by mouth 2 times daily (Patient not taking: Reported on 11/30/2023), Disp: 60 tablet, Rfl: 3    tretinoin (RETIN-A) 0.05 % cream, Apply topically At Bedtime (Patient not taking: Reported on 11/30/2023), Disp: 45 g, Rfl: 3    VITAMIN D PO, , Disp: , Rfl:     ALLERGIES:   Allergies   Allergen Reactions    No Known Drug  Allergy        FAMILY HISTORY:   Family History   Problem Relation Age of Onset    Neurologic Disorder Mother         migraines    Family History Negative Father     Family History Negative Sister        SOCIAL HISTORY:   Social History     Socioeconomic History    Marital status:      Spouse name: Not on file    Number of children: Not on file    Years of education: Not on file    Highest education level: Not on file   Occupational History    Not on file   Tobacco Use    Smoking status: Never    Smokeless tobacco: Never   Vaping Use    Vaping status: Never Used   Substance and Sexual Activity    Alcohol use: Yes     Comment: occ    Drug use: No    Sexual activity: Yes     Partners: Female   Other Topics Concern    Parent/sibling w/ CABG, MI or angioplasty before 65F 55M? No   Social History Narrative    Not on file     Social Drivers of Health     Financial Resource Strain: Low Risk  (11/30/2023)    Financial Resource Strain     Within the past 12 months, have you or your family members you live with been unable to get utilities (heat, electricity) when it was really needed?: No   Food Insecurity: Low Risk  (11/30/2023)    Food Insecurity     Within the past 12 months, did you worry that your food would run out before you got money to buy more?: No     Within the past 12 months, did the food you bought just not last and you didn t have money to get more?: No   Transportation Needs: Low Risk  (11/30/2023)    Transportation Needs     Within the past 12 months, has lack of transportation kept you from medical appointments, getting your medicines, non-medical meetings or appointments, work, or from getting things that you need?: No   Physical Activity: Not on file   Stress: Not on file   Social Connections: Not on file   Interpersonal Safety: Low Risk  (11/30/2023)    Interpersonal Safety     Do you feel physically and emotionally safe where you currently live?: Yes     Within the past 12 months, have you been  hit, slapped, kicked or otherwise physically hurt by someone?: No     Within the past 12 months, have you been humiliated or emotionally abused in other ways by your partner or ex-partner?: No   Housing Stability: Low Risk  (11/30/2023)    Housing Stability     Do you have housing? : Yes     Are you worried about losing your housing?: No       REVIEW OF SYSTEMS:  Skin: No rash, pruritis, or skin pigmentation  Eyes: No changes in vision  Ears/Nose/Throat: No changes in hearing, no nosebleeds  Respiratory: No shortness of breath, dyspnea on exertion, cough, or hemoptysis  Cardiovascular: No chest pain or palpitations  Gastrointestinal: No diarrhea or constipation. No abdominal pain. No hematochezia  Genitourinary: see HPI  Musculoskeletal: No pain or swelling of joints, normal range of motion  Neurologic: No weakness or tremors  Psychiatric: No recent changes in memory or mood  Hematologic/Lymphatic/Immunologic: No easy bruising or enlarged lymph nodes  Endocrine: No weight gain or loss      HEIGHT: Data Unavailable     WEIGHT: 0 lbs 0 oz   BP: Data Unavailable    PULSE: Data Unavailable    EXAM:   General: Alert and oriented to time, place, and self. In NAD   HEENT: Head AT/NC, EOMI, CN Grossly intact   Lungs: no respiratory distress, or pursed lip breathing   Heart: No obvious jugular venous distension present   Musculoskeltal: Normal movements. Normal appearing musculature  Skin: no suspicious lesions or rashes   Neuro: Alert, oriented, speech and mentation normal; moving all 4 extremities equally.   Psych: affect and mood normal      DIAGNOSIS: Request for sterilization    PLAN: The risks of the procedure as well as expectations for recovery and outcomes were splint in detail to him.  He was counseled on the risks for bleeding infection and pain after the procedure.  He was instructed to continue to use contraception until he had proven azoospermia on a semen specimen.  This would normally be collected at least 3  months after the procedure.  He was instructed to hold all anticoagulants medications for one week prior to the procedure.  He was also instructed to shave the scrotum prior to procedure.  It was recommended that he have someone else drive him home after his vasectomy.  In light of these risks and expectations he would like to proceed.  We are scheduling a vasectomy in the office in the near future.  This visit today was performed via video.  He understands that because of this I was not able to examine the testicles in person today.  I will perform an examination at the beginning of the vasectomy and he understands that there is a small chance the procedure may need to be moved to the operating room.    Mehdi Barrera M.D.      Virtual Visit Details    Type of service:  Video Visit     Originating Location (pt. Location): Home    Distant Location (provider location):  On-site  Platform used for Video Visit: Derrick

## 2024-10-30 NOTE — LETTER
10/30/2024       RE: Wiliam Kellogg  [address] [address] [address]     Dear Colleague,    Thank you for referring your patient, Wiliam Kellogg, to the [facility] [place] at [facility]. Please see a copy of my visit note below.    VASECTOMY CONSULTATION NOTE  [facility]  [phone]  DATE OF VISIT: 10/30/2024    PATIENT NAME: Wiliam Kellogg    YOB: 1985      REASON FOR CONSULTATION: Mr. Wiliam Kellogg is a 39 year old year old gentleman who is being seen as a virtual visit in the urology clinic today requesting a vasectomy. He has 2 children and he wishes to have a vasectomy for birth control. He has no prior urologic history and has had no prior surgery on the testicles. He has no symptoms in the testicles.    PAST MEDICAL HISTORY:   Past Medical History:   Diagnosis Date     Dysphagia 3/2003    ?Zenkers, resolved sx with protonix, skipped GI referral     Enlargement of lymph nodes 3/5/2002    Neck, [facility] by Dr. Rebollar of [facility]... reactive & resolved     Other acne 2001     Screening for depression 10/21/2002    At [facility], believed single Major Depressive Disorder     Unspecified adjustment reaction 10/21/2002    Dx at [facility], Jonny Rodriguez, Ph.D.,        PAST SURGICAL HISTORY:   Past Surgical History:   Procedure Laterality Date     wisdom teeth extraction       [facility] ANESTH,REPAIR UP HERNIA OMPHALOCELE  1988     [facility] REPAIR NONUNION RADIUS AND ULNA  1991    Frx due to trauma, ORIF ulna and radius L arm       MEDICATIONS:   Current Outpatient Medications:      Ascorbic Acid (STEPHIE-C PO), , Disp: , Rfl:      ketoconazole (NIZORAL) 2 % cream, Apply topically 2 times daily (Patient not taking: Reported on 11/30/2023), Disp: 30 g, Rfl: 1     MAGNESIUM PO, , Disp: , Rfl:      Omega-3 Fatty Acids (FISH OIL PO), , Disp: , Rfl:      sulfamethoxazole-trimethoprim (BACTRIM DS,SEPTRA DS) 800-160 MG per tablet, Take 1  tablet by mouth 2 times daily (Patient not taking: Reported on 11/30/2023), Disp: 60 tablet, Rfl: 3     tretinoin (RETIN-A) 0.05 % cream, Apply topically At Bedtime (Patient not taking: Reported on 11/30/2023), Disp: 45 g, Rfl: 3     VITAMIN D PO, , Disp: , Rfl:     ALLERGIES:   Allergies   Allergen Reactions     No Known Drug Allergy        FAMILY HISTORY:   Family History   Problem Relation Age of Onset     Neurologic Disorder Mother         migraines     Family History Negative Father      Family History Negative Sister        SOCIAL HISTORY:   Social History     Socioeconomic History     Marital status:      Spouse name: Not on file     Number of children: Not on file     Years of education: Not on file     Highest education level: Not on file   Occupational History     Not on file   Tobacco Use     Smoking status: Never     Smokeless tobacco: Never   Vaping Use     Vaping status: Never Used   Substance and Sexual Activity     Alcohol use: Yes     Comment: occ     Drug use: No     Sexual activity: Yes     Partners: Female   Other Topics Concern     Parent/sibling w/ CABG, MI or angioplasty before 65F 55M? No   Social History Narrative     Not on file     Social Drivers of Health     Financial Resource Strain: Low Risk  (11/30/2023)    Financial Resource Strain      Within the past 12 months, have you or your family members you live with been unable to get utilities (heat, electricity) when it was really needed?: No   Food Insecurity: Low Risk  (11/30/2023)    Food Insecurity      Within the past 12 months, did you worry that your food would run out before you got money to buy more?: No      Within the past 12 months, did the food you bought just not last and you didn t have money to get more?: No   Transportation Needs: Low Risk  (11/30/2023)    Transportation Needs      Within the past 12 months, has lack of transportation kept you from medical appointments, getting your medicines, non-medical meetings  or appointments, work, or from getting things that you need?: No   Physical Activity: Not on file   Stress: Not on file   Social Connections: Not on file   Interpersonal Safety: Low Risk  (11/30/2023)    Interpersonal Safety      Do you feel physically and emotionally safe where you currently live?: Yes      Within the past 12 months, have you been hit, slapped, kicked or otherwise physically hurt by someone?: No      Within the past 12 months, have you been humiliated or emotionally abused in other ways by your partner or ex-partner?: No   Housing Stability: Low Risk  (11/30/2023)    Housing Stability      Do you have housing? : Yes      Are you worried about losing your housing?: No       REVIEW OF SYSTEMS:  Skin: No rash, pruritis, or skin pigmentation  Eyes: No changes in vision  Ears/Nose/Throat: No changes in hearing, no nosebleeds  Respiratory: No shortness of breath, dyspnea on exertion, cough, or hemoptysis  Cardiovascular: No chest pain or palpitations  Gastrointestinal: No diarrhea or constipation. No abdominal pain. No hematochezia  Genitourinary: see HPI  Musculoskeletal: No pain or swelling of joints, normal range of motion  Neurologic: No weakness or tremors  Psychiatric: No recent changes in memory or mood  Hematologic/Lymphatic/Immunologic: No easy bruising or enlarged lymph nodes  Endocrine: No weight gain or loss      HEIGHT: Data Unavailable     WEIGHT: 0 lbs 0 oz   BP: Data Unavailable    PULSE: Data Unavailable    EXAM:   General: Alert and oriented to time, place, and self. In NAD   HEENT: Head AT/NC, EOMI, CN Grossly intact   Lungs: no respiratory distress, or pursed lip breathing   Heart: No obvious jugular venous distension present   Musculoskeltal: Normal movements. Normal appearing musculature  Skin: no suspicious lesions or rashes   Neuro: Alert, oriented, speech and mentation normal; moving all 4 extremities equally.   Psych: affect and mood normal      DIAGNOSIS: Request for  sterilization    PLAN: The risks of the procedure as well as expectations for recovery and outcomes were splint in detail to him.  He was counseled on the risks for bleeding infection and pain after the procedure.  He was instructed to continue to use contraception until he had proven azoospermia on a semen specimen.  This would normally be collected at least 3 months after the procedure.  He was instructed to hold all anticoagulants medications for one week prior to the procedure.  He was also instructed to shave the scrotum prior to procedure.  It was recommended that he have someone else drive him home after his vasectomy.  In light of these risks and expectations he would like to proceed.  We are scheduling a vasectomy in the office in the near future.  This visit today was performed via video.  He understands that because of this I was not able to examine the testicles in person today.  I will perform an examination at the beginning of the vasectomy and he understands that there is a small chance the procedure may need to be moved to the operating room.    Mehdi Barrera M.D.      Virtual Visit Details    Type of service:  Video Visit     Originating Location (pt. Location): Home    Distant Location (provider location):  On-site  Platform used for Video Visit: Derrick      Again, thank you for allowing me to participate in the care of your patient.      Sincerely,    Mehdi Barrera MD

## 2024-11-01 ENCOUNTER — TELEPHONE (OUTPATIENT)
Dept: UROLOGY | Facility: CLINIC | Age: 39
End: 2024-11-01
Payer: COMMERCIAL

## 2024-11-01 NOTE — TELEPHONE ENCOUNTER
----- Message from Harriet LYONS sent at 10/30/2024  3:57 PM CDT -----  Regarding: Vasectomy  Return for Schedule vasectomy in office.    SDB  10/30//24

## 2024-12-05 ENCOUNTER — OFFICE VISIT (OUTPATIENT)
Dept: SURGERY | Facility: CLINIC | Age: 39
End: 2024-12-05
Payer: COMMERCIAL

## 2024-12-05 VITALS
HEART RATE: 61 BPM | RESPIRATION RATE: 16 BRPM | BODY MASS INDEX: 30.13 KG/M2 | OXYGEN SATURATION: 100 % | WEIGHT: 210 LBS | DIASTOLIC BLOOD PRESSURE: 88 MMHG | SYSTOLIC BLOOD PRESSURE: 140 MMHG

## 2024-12-05 DIAGNOSIS — R10.30 INGUINAL PAIN, UNSPECIFIED LATERALITY: Primary | ICD-10-CM

## 2024-12-06 NOTE — PROGRESS NOTES
[facility]  New Patient Office Visit    Assessment:   Wiliam Kellogg is a 39 year old male with possible bilateral inguinal hernias. His symptoms of burning groin pain after lifting do seem consistent with hernias, however, he does not have obvious inguinal hernias on physical exam.       Plan:    I recommend we start with an ultrasound hernia evaluation with valsalva to assess for small inguinal hernias.     If the ultrasound is positive, then we will schedule a robotic-assisted bilateral inguinal hernia repair at the patient's convenience. He would need a preop H&P by his PCP.    We have discussed observation, reduction techniques and importance, incarceration and strangulation signs, symptoms and importance as well as need to seek emergency treatment.      We have had a detailed discussion regarding the nature of inguinal hernias, and that watchful waiting for small asymptomatic hernias is acceptable, but that in general they do tend to enlarge or become symptomatic over time. Surgery, indications, alternatives, risks, benefits, incisions, scarring, anesthesia, recovery, mesh, infection, bleeding, numbness, nerve damage and chronic pain, testicular loss, hernia recurrence, lifting and activity limitations after surgery.  All questions have been answered to the best of my ability.    I will call him with the results of the ultrasound when it has been completed.     Surekha Maloney MD    HPI:  Wiliam Kellogg is a 39 year old male who presents for evaluation of pain in the bilateral groin.   He first noticed it several weeks ago. He describes no particular inciting event, but describes burning pain in the groin during and after workouts/lifting. He has not noticed any bulging.     Prior incarceration:  No   Nausea/vomitting/bloating:  No   Bulge/mass:  No    Previous herniorrhaphy:  No     Constipation: No  Dysuria: No  Cough: No  Diabetes: No  Current smoker: No    Heavy lifting > 20 lb: Yes - Strenuous  workouts    Past Medical History:  Past Medical History:   Diagnosis Date    Dysphagia 3/2003    ?Zenkers, resolved sx with protonix, skipped GI referral    Enlargement of lymph nodes 3/5/2002    Neck, seen by Dr. Rebollar of ur[facility]... reactive & resolved    Other acne 2001    Screening for depression 10/21/2002    At [facility], believed single Major Depressive Disorder    Unspecified adjustment reaction 10/21/2002    Dx at [facility], Jonny Rodriguez, Ph.D., [facility]       Current Outpatient Medications   Medication Sig Dispense Refill    Ascorbic Acid (STEPHIE-C PO)       MAGNESIUM PO       Omega-3 Fatty Acids (FISH OIL PO)       VITAMIN D PO        No current facility-administered medications for this visit.        Past Surgical History:  Past Surgical History:   Procedure Laterality Date    wisdom teeth extraction      [facility] ANESTH,REPAIR UP HERNIA OMPHALOCELE  1988    [facility] REPAIR NONUNION RADIUS AND ULNA  1991    Frx due to trauma, ORIF ulna and radius L arm      Notably, The patient's chart mentions omphalocele repair as a [age] year old. The patient does not think this is accurate and has a normal appearing umbilicus. He may have had a repair of a minor umbilical hernia instead.       Social History:  Social History     Tobacco Use    Smoking status: Never    Smokeless tobacco: Never   Vaping Use    Vaping status: Never Used   Substance Use Topics    Alcohol use: Yes     Comment: occ    Drug use: No        Family History:  Family History   Problem Relation Age of Onset    Neurologic Disorder Mother         migraines    Family History Negative Father     Family History Negative Sister      No Family history of bleeding or clotting disorders or reactions to anesthesia    ROS:  The 10 point review of systems is negative other than noted in the HPI and above.    PE:    Vitals: BP (!) 140/88   Pulse 61   Resp 16   Wt 95.3 kg (210 lb)   SpO2 100%   BMI 30.13 kg/m    BMI= Body mass index is 30.13 kg/m .  General- Well-developed,  well-nourished, patient able to get up on table without difficulty.  HEENT- Mucous membranes moist.  Sclera are nonicteric.  Lymph -  No inguinal lymphadenopathy or masses   Respiratory- regular and non labored  CV - regular pulse  Abdomen- abdomen is soft without significant tenderness, masses, organomegaly or guarding, no umbilical hernia  Hernia- Upon standing there is not an obvious bulge in either groin  Palpating with a finger at the external ring, a left inguinal hernia is not apparent at rest or with valsalva.   Palpating with a finger at the external ring, a right inguinal hernia is not apparent at rest or with valsalva. There does seem to be some mild laxity to the right of the pubic bone.                Testes are descended bilaterally and normal  Extremities- without edema  Psych- mood and affect are appropriate  Neurologic- alert, speech is clear, moves all extremities with good strength  Integument- without lesions, rashes, or jaundice      This note may have been created using voice recognition software. Undetected word substitutions or other errors may have occurred.     45 minutes total time spent on the date of this encounter doing: chart review, review of test results, patient visit, physical exam, education, counseling, developing plan of care, and documenting.    Surekha Maloney MD  12/05/24 9:10 PM     Please route or send letter to:  Primary Care Provider (PCP)

## 2024-12-17 ENCOUNTER — HOSPITAL ENCOUNTER (OUTPATIENT)
Dept: ULTRASOUND IMAGING | Facility: CLINIC | Age: 39
Discharge: HOME | End: 2024-12-17
Attending: SURGERY
Payer: COMMERCIAL

## 2024-12-17 DIAGNOSIS — R10.30 INGUINAL PAIN, UNSPECIFIED LATERALITY: ICD-10-CM

## 2024-12-17 PROCEDURE — 76705 ECHO EXAM OF ABDOMEN: CPT

## 2025-01-15 ENCOUNTER — VIRTUAL VISIT (OUTPATIENT)
Dept: UROLOGY | Facility: CLINIC | Age: 40
End: 2025-01-15
Payer: COMMERCIAL

## 2025-01-15 DIAGNOSIS — Z30.2 ENCOUNTER FOR STERILIZATION: Primary | ICD-10-CM

## 2025-01-15 PROCEDURE — 98005 SYNCH AUDIO-VIDEO EST LOW 20: CPT | Performed by: UROLOGY

## 2025-01-15 RX ORDER — ACETAMINOPHEN 650 MG/1
650 SUPPOSITORY RECTAL ONCE
OUTPATIENT
Start: 2025-01-15

## 2025-01-15 RX ORDER — CEFAZOLIN SODIUM 2 G/50ML
2 SOLUTION INTRAVENOUS
OUTPATIENT
Start: 2025-01-15

## 2025-01-15 RX ORDER — ACETAMINOPHEN 325 MG/1
975 TABLET ORAL ONCE
OUTPATIENT
Start: 2025-01-15 | End: 2025-01-15

## 2025-01-15 RX ORDER — CEFAZOLIN SODIUM 2 G/50ML
2 SOLUTION INTRAVENOUS SEE ADMIN INSTRUCTIONS
OUTPATIENT
Start: 2025-01-15

## 2025-01-15 NOTE — NURSING NOTE
Current patient location: [address] [place] [id number]    Is the patient currently in the state of [place]? YES    Visit mode: VIDEO    If the visit is dropped, the patient can be reconnected by:VIDEO VISIT: Text to cell phone:   Telephone Information:   Mobile [phone]       Will anyone else be joining the visit? NO  (If patient encounters technical issues they should call [phone]    Are changes needed to the allergy or medication list? Pt stated no med changes    Are refills needed on medications prescribed by this physician? NO    Rooming Documentation:  Not applicable    Reason for visit: RECHECK (Vasectomy and hernia repair discussion )    Krysta GRACIA

## 2025-01-15 NOTE — LETTER
1/15/2025       RE: Wiliam Kellogg  [address] [address] [address]     Dear Colleague,    Thank you for referring your patient, Wiliam Kellogg, to the [facility] [place] at [facility]. Please see a copy of my visit note below.    Urology virtual clinic note    This is a 39-year-old gentleman who I spoke with back in October about having an office vasectomy performed.  He has 2 children.  He has since been diagnosed with an inguinal hernia and plans on having a robotic inguinal hernia repair performed with Dr. Maloney.  He had sent a message asking if he could have the vasectomy done under the same anesthetic.  I was unable to reach him by the phone after multiple attempts so he was set up for virtual visit today.    We discussed his options for having his vasectomy performed under the same anesthetic as his robotic hernia repair.  He could have a robotic's ectomy performed at the same time as hernia repair in which I would remove a small piece of the intra-abdominal vas deferens near the inguinal canal.  This carries minimal risk and would generally not add to the recovery from his hernia repair.  However, I counseled him that if the vasectomy was performed and this method is most likely not reversible.  Alternatively, he could have a standard vasectomy performed under the same anesthetic through the scrotal skin.    He discussed things over with his wife and they wish to proceed with robotic assisted laparoscopic vasectomy at the same time as his robotic hernia repair.  He understands the risks of the procedure.  We will get him scheduled in conjunction with Dr. Maloney        Virtual Visit Details    Video start time: 9:50 AM  Video end time: 10 AM    Type of service:  Video Visit     Originating Location (pt. Location): Home    Distant Location (provider location):  On-site  Platform used for Video Visit: AmWell      Again, thank  you for allowing me to participate in the care of your patient.      Sincerely,    Mehdi Barrera MD

## 2025-02-01 ENCOUNTER — HEALTH MAINTENANCE LETTER (OUTPATIENT)
Age: 40
End: 2025-02-01

## 2025-02-11 ENCOUNTER — TELEPHONE (OUTPATIENT)
Dept: SURGERY | Facility: CLINIC | Age: 40
End: 2025-02-11
Payer: COMMERCIAL

## 2025-02-11 NOTE — TELEPHONE ENCOUNTER
Type of surgery: COORD CASE - ROBOTIC ASSISTED RIGHT INGUINAL HERNIA REPAIR , POSSIBLE LEFT   Location of surgery: [facility]  Date and time of surgery: 4/9/2025 @ 1:15 PM   Surgeon: Surekha Maloney MD   Pre-Op Appt Date: PATIENT TO SCHEDULE    Post-Op Appt Date: PATIENT TO SCHEDULE     Packet sent out: Yes  Pre-cert/Authorization completed:  Not Applicable  Date: 2/11/2025           COORD CASE - ROBOTIC ASSISTED RIGHT INGUINAL HERNIA REPAIR , POSSIBLE LEFT GENERAL PT INST TO HAVE H&P WITH PCP 90 MIN REQ PA ASSIST [facility]GB NMS DR MUNOZ DOING VASECTOMY 30 MIN REQ NMS

## 2025-03-10 ENCOUNTER — OFFICE VISIT (OUTPATIENT)
Dept: FAMILY MEDICINE | Facility: CLINIC | Age: 40
End: 2025-03-10
Payer: COMMERCIAL

## 2025-03-10 VITALS
DIASTOLIC BLOOD PRESSURE: 78 MMHG | HEIGHT: 70 IN | HEART RATE: 50 BPM | TEMPERATURE: 98.1 F | WEIGHT: 216 LBS | OXYGEN SATURATION: 99 % | RESPIRATION RATE: 16 BRPM | BODY MASS INDEX: 30.92 KG/M2 | SYSTOLIC BLOOD PRESSURE: 130 MMHG

## 2025-03-10 DIAGNOSIS — Z30.2 ENCOUNTER FOR STERILIZATION IN MALE: ICD-10-CM

## 2025-03-10 DIAGNOSIS — R03.0 ELEVATED BLOOD PRESSURE READING WITHOUT DIAGNOSIS OF HYPERTENSION: ICD-10-CM

## 2025-03-10 DIAGNOSIS — Z01.818 PRE-OP EXAM: Primary | ICD-10-CM

## 2025-03-10 DIAGNOSIS — K40.90 RIGHT INGUINAL HERNIA: ICD-10-CM

## 2025-03-10 LAB
ERYTHROCYTE [DISTWIDTH] IN BLOOD BY AUTOMATED COUNT: 13.4 % (ref 10–15)
HCT VFR BLD AUTO: 51.4 % (ref 40–53)
HGB BLD-MCNC: 17.5 G/DL (ref 13.3–17.7)
MCH RBC QN AUTO: 31.6 PG (ref 26.5–33)
MCHC RBC AUTO-ENTMCNC: 34 G/DL (ref 31.5–36.5)
MCV RBC AUTO: 93 FL (ref 78–100)
PLATELET # BLD AUTO: 191 10E3/UL (ref 150–450)
RBC # BLD AUTO: 5.54 10E6/UL (ref 4.4–5.9)
WBC # BLD AUTO: 4.5 10E3/UL (ref 4–11)

## 2025-03-10 PROCEDURE — 80048 BASIC METABOLIC PNL TOTAL CA: CPT | Performed by: FAMILY MEDICINE

## 2025-03-10 PROCEDURE — 99214 OFFICE O/P EST MOD 30 MIN: CPT | Performed by: FAMILY MEDICINE

## 2025-03-10 PROCEDURE — 36415 COLL VENOUS BLD VENIPUNCTURE: CPT | Performed by: FAMILY MEDICINE

## 2025-03-10 PROCEDURE — 85027 COMPLETE CBC AUTOMATED: CPT | Performed by: FAMILY MEDICINE

## 2025-03-10 ASSESSMENT — PAIN SCALES - GENERAL: PAINLEVEL_OUTOF10: NO PAIN (0)

## 2025-03-10 NOTE — PROGRESS NOTES
Preoperative Evaluation  [facility]  [address] [address]  Phone: [phone]  Primary Provider: Louann Roldan MD  Pre-op Performing Provider: Louann Roldan MD  Mar 10, 2025             3/10/2025   Surgical Information   What procedure is being done? Robotic assisted right inguinal hernia repair  Robotic assisted laparoscopic bilateral vasectomy   Facility or Hospital where procedure/surgery will be performed: [facility]   Who is doing the procedure / surgery? Dr. Surekha Barrera   Date of surgery / procedure: 3-   Time of surgery / procedure: 11am   Where do you plan to recover after surgery? at home with family     Fax number for surgical facility: Note does not need to be faxed, will be available electronically in Epic.    Assessment & Plan     The proposed surgical procedure is considered LOW risk.    Pre-op exam  - CBC with platelets; Future  - Basic metabolic panel  (Ca, Cl, CO2, Creat, Gluc, K, Na, BUN); Future  - CBC with platelets  - Basic metabolic panel  (Ca, Cl, CO2, Creat, Gluc, K, Na, BUN)    Right inguinal hernia    Encounter for sterilization in male    Elevated blood pressure reading without diagnosis of hypertension - corrected on recheck      Risks and Recommendations  The patient has the following additional risks and recommendations for perioperative complications:   - No identified additional risk factors other than previously addressed         Recommendation  Approval given to proceed with proposed procedure, without further diagnostic evaluation.    Edwige Swain is a 39 year old, presenting for the following:  Pre-Op Exam (Here today for a Pre-Op Exam. [facility]. DOS: 03/21/2025. Hernia and Vasectomy. Dr Maloney and Dr Barrera. )          3/10/2025    10:13 AM   Additional Questions   Roomed by Denise King CMA   Accompanied by Self     HPI: symptomatic right inguinal hernia, desire  for sterilization          3/10/2025   Pre-Op Questionnaire   Have you ever had a heart attack or stroke? No   Have you ever had surgery on your heart or blood vessels, such as a stent placement, a coronary artery bypass, or surgery on an artery in your head, neck, heart, or legs? No   Do you have chest pain with activity? No   Do you have a history of heart failure? No   Do you currently have a cold, bronchitis or symptoms of other infection? No   Do you have a cough, shortness of breath, or wheezing? No   Do you or anyone in your family have previous history of blood clots? No   Do you or does anyone in your family have a serious bleeding problem such as prolonged bleeding following surgeries or cuts? No   Have you ever had problems with anemia or been told to take iron pills? No   Have you had any abnormal blood loss such as black, tarry or bloody stools? No   Have you ever had a blood transfusion? No   Are you willing to have a blood transfusion if it is medically needed before, during, or after your surgery? Yes   Have you or any of your relatives ever had problems with anesthesia? No   Do you have sleep apnea, excessive snoring or daytime drowsiness? No   Do you have any artifical heart valves or other implanted medical devices like a pacemaker, defibrillator, or continuous glucose monitor? No   Do you have artificial joints? No   Are you allergic to latex? No     Health Care Directive  Patient does not have a Health Care Directive: Discussed advance care planning with patient; information given to patient to review.    Preoperative Review of    reviewed - no record of controlled substances prescribed.      Status of Chronic Conditions:  See problem list for active medical problems.  Problems all longstanding and stable, except as noted/documented.  See ROS for pertinent symptoms related to these conditions.    Patient Active Problem List    Diagnosis Date Noted    History of isotretinoin therapy  "11/01/2016     Priority: Medium    Acne vulgaris 06/29/2012     Priority: Medium    CARDIOVASCULAR SCREENING; LDL GOAL LESS THAN 160 02/21/2012     Priority: Medium      Past Medical History:   Diagnosis Date    Dysphagia 3/2003    ?Zenkers, resolved sx with protonix, skipped GI referral    Enlargement of lymph nodes 3/5/2002    Neck, seen by Dr. Rebollar of urg... reactive & resolved    Other acne 2001    Screening for depression 10/21/2002    At [facility], believed single Major Depressive Disorder    Unspecified adjustment reaction 10/21/2002    Dx at [facility], Jonny Rodriguez, Ph.D., LP     Past Surgical History:   Procedure Laterality Date    wisdom teeth extraction      ZC ANESTH,REPAIR UP HERNIA OMPHALOCELE  1988    Z REPAIR NONUNION RADIUS AND ULNA  1991    Frx due to trauma, ORIF ulna and radius L arm     Current Outpatient Medications   Medication Sig Dispense Refill    Ascorbic Acid (STEPHIE-C PO)       MAGNESIUM PO       Omega-3 Fatty Acids (FISH OIL PO)       VITAMIN D PO          Allergies   Allergen Reactions    No Known Drug Allergy         Social History     Tobacco Use    Smoking status: Never    Smokeless tobacco: Never   Substance Use Topics    Alcohol use: Yes     Comment: occ     Family History   Problem Relation Age of Onset    Neurologic Disorder Mother         migraines    Family History Negative Father     Family History Negative Sister      History   Drug Use No             Review of Systems  Constitutional, neuro, ENT, endocrine, pulmonary, cardiac, gastrointestinal, genitourinary, musculoskeletal, integument and psychiatric systems are negative, except as otherwise noted.    Objective    BP (!) 156/79 (BP Location: Right arm, Patient Position: Sitting, Cuff Size: Adult Large)   Pulse 50   Temp 98.1  F (36.7  C) (Oral)   Resp 16   Ht 1.778 m (5' 10\")   Wt 98 kg (216 lb)   SpO2 99%   BMI 30.99 kg/m     Estimated body mass index is 30.99 kg/m  as calculated from the following:    Height as of " "this encounter: 1.778 m (5' 10\").    Weight as of this encounter: 98 kg (216 lb).  Physical Exam  GENERAL: alert and no distress  EYES: Eyes grossly normal to inspection, PERRL and conjunctivae and sclerae normal  HENT: ear canals and TM's normal, nose and mouth without ulcers or lesions  NECK: no adenopathy, no asymmetry, masses, or scars  RESP: lungs clear to auscultation - no rales, rhonchi or wheezes  CV: regular rate and rhythm, normal S1 S2, no S3 or S4, no murmur, click or rub, no peripheral edema  ABDOMEN: soft, nontender, no hepatosplenomegaly, no masses and bowel sounds normal  MS: no gross musculoskeletal defects noted, no edema  SKIN: no suspicious lesions or rashes  NEURO: Normal strength and tone, mentation intact and speech normal  PSYCH: mentation appears normal, affect normal/bright    No results for input(s): \"HGB\", \"PLT\", \"INR\", \"NA\", \"POTASSIUM\", \"CR\", \"A1C\" in the last 8760 hours.     Diagnostics  Labs pending at this time.  Results will be reviewed when available.   No EKG required, no history of coronary heart disease, significant arrhythmia, peripheral arterial disease or other structural heart disease.    Revised Cardiac Risk Index (RCRI)  The patient has the following serious cardiovascular risks for perioperative complications:   - No serious cardiac risks = 0 points     RCRI Interpretation: 0 points: Class I (very low risk - 0.4% complication rate)         Signed Electronically by: Louann Roldan MD  A copy of this evaluation report is provided to the requesting physician.         "

## 2025-03-11 LAB
ANION GAP SERPL CALCULATED.3IONS-SCNC: 5 MMOL/L (ref 7–15)
BUN SERPL-MCNC: 16.7 MG/DL (ref 6–20)
CALCIUM SERPL-MCNC: 9.7 MG/DL (ref 8.8–10.4)
CHLORIDE SERPL-SCNC: 104 MMOL/L (ref 98–107)
CREAT SERPL-MCNC: 1.48 MG/DL (ref 0.67–1.17)
EGFRCR SERPLBLD CKD-EPI 2021: 62 ML/MIN/1.73M2
GLUCOSE SERPL-MCNC: 101 MG/DL (ref 70–99)
HCO3 SERPL-SCNC: 31 MMOL/L (ref 22–29)
POTASSIUM SERPL-SCNC: 5.5 MMOL/L (ref 3.4–5.3)
SODIUM SERPL-SCNC: 140 MMOL/L (ref 135–145)

## 2025-03-13 DIAGNOSIS — R79.89 ELEVATED SERUM CREATININE: Primary | ICD-10-CM

## 2025-03-21 ENCOUNTER — HOSPITAL ENCOUNTER (OUTPATIENT)
Facility: CLINIC | Age: 40
Discharge: HOME | End: 2025-03-21
Attending: SURGERY | Admitting: SURGERY
Payer: COMMERCIAL

## 2025-03-21 VITALS
DIASTOLIC BLOOD PRESSURE: 85 MMHG | WEIGHT: 213.5 LBS | OXYGEN SATURATION: 98 % | SYSTOLIC BLOOD PRESSURE: 107 MMHG | BODY MASS INDEX: 30.63 KG/M2 | HEART RATE: 66 BPM | TEMPERATURE: 98.6 F | RESPIRATION RATE: 18 BRPM

## 2025-03-21 DIAGNOSIS — K40.20 NON-RECURRENT BILATERAL INGUINAL HERNIA WITHOUT OBSTRUCTION OR GANGRENE: Primary | ICD-10-CM

## 2025-03-21 PROCEDURE — 88304 TISSUE EXAM BY PATHOLOGIST: CPT | Mod: TC | Performed by: SURGERY

## 2025-03-21 PROCEDURE — 88302 TISSUE EXAM BY PATHOLOGIST: CPT | Mod: 26 | Performed by: PATHOLOGY

## 2025-03-21 PROCEDURE — C1781 MESH (IMPLANTABLE): HCPCS | Performed by: SURGERY

## 2025-03-21 PROCEDURE — 250N000013 HC RX MED GY IP 250 OP 250 PS 637: Performed by: UROLOGY

## 2025-03-21 PROCEDURE — 49650 LAP ING HERNIA REPAIR INIT: CPT | Mod: 50 | Performed by: SURGERY

## 2025-03-21 PROCEDURE — 88302 TISSUE EXAM BY PATHOLOGIST: CPT | Mod: TC | Performed by: SURGERY

## 2025-03-21 PROCEDURE — 250N000013 HC RX MED GY IP 250 OP 250 PS 637: Performed by: SURGERY

## 2025-03-21 PROCEDURE — 370N000017 HC ANESTHESIA TECHNICAL FEE, PER MIN: Performed by: SURGERY

## 2025-03-21 PROCEDURE — 250N000025 HC SEVOFLURANE, PER MIN: Performed by: SURGERY

## 2025-03-21 PROCEDURE — 710N000012 HC RECOVERY PHASE 2, PER MINUTE: Performed by: SURGERY

## 2025-03-21 PROCEDURE — 250N000011 HC RX IP 250 OP 636: Mod: JZ | Performed by: SURGERY

## 2025-03-21 PROCEDURE — 360N000080 HC SURGERY LEVEL 7, PER MIN: Performed by: SURGERY

## 2025-03-21 PROCEDURE — 272N000001 HC OR GENERAL SUPPLY STERILE: Performed by: SURGERY

## 2025-03-21 PROCEDURE — 49650 LAP ING HERNIA REPAIR INIT: CPT | Mod: AS | Performed by: PHYSICIAN ASSISTANT

## 2025-03-21 PROCEDURE — 710N000009 HC RECOVERY PHASE 1, LEVEL 1, PER MIN: Performed by: SURGERY

## 2025-03-21 PROCEDURE — 250N000009 HC RX 250: Performed by: SURGERY

## 2025-03-21 PROCEDURE — 258N000003 HC RX IP 258 OP 636: Performed by: ANESTHESIOLOGY

## 2025-03-21 PROCEDURE — 999N000141 HC STATISTIC PRE-PROCEDURE NURSING ASSESSMENT: Performed by: SURGERY

## 2025-03-21 PROCEDURE — 88304 TISSUE EXAM BY PATHOLOGIST: CPT | Mod: 26 | Performed by: PATHOLOGY

## 2025-03-21 DEVICE — LAPAROSCOPIC SELF-FIXATING MESH POLYESTER WITH POLYLACTIC ACID GRIPS AND COLLAGEN FILM
Type: IMPLANTABLE DEVICE | Site: ABDOMEN | Status: FUNCTIONAL
Brand: PROGRIP

## 2025-03-21 RX ORDER — ACETAMINOPHEN 325 MG/1
975 TABLET ORAL ONCE
Status: DISCONTINUED | OUTPATIENT
Start: 2025-03-21 | End: 2025-03-21 | Stop reason: HOSPADM

## 2025-03-21 RX ORDER — ACETAMINOPHEN 325 MG/1
650 TABLET ORAL
Status: DISCONTINUED | OUTPATIENT
Start: 2025-03-21 | End: 2025-03-21 | Stop reason: HOSPADM

## 2025-03-21 RX ORDER — ACETAMINOPHEN 500 MG
1000 TABLET ORAL EVERY 6 HOURS PRN
COMMUNITY
Start: 2025-03-21

## 2025-03-21 RX ORDER — DEXAMETHASONE SODIUM PHOSPHATE 4 MG/ML
4 INJECTION, SOLUTION INTRA-ARTICULAR; INTRALESIONAL; INTRAMUSCULAR; INTRAVENOUS; SOFT TISSUE
Status: DISCONTINUED | OUTPATIENT
Start: 2025-03-21 | End: 2025-03-21 | Stop reason: HOSPADM

## 2025-03-21 RX ORDER — NALOXONE HYDROCHLORIDE 0.4 MG/ML
0.1 INJECTION, SOLUTION INTRAMUSCULAR; INTRAVENOUS; SUBCUTANEOUS
Status: DISCONTINUED | OUTPATIENT
Start: 2025-03-21 | End: 2025-03-21 | Stop reason: HOSPADM

## 2025-03-21 RX ORDER — ONDANSETRON 4 MG/1
4 TABLET, ORALLY DISINTEGRATING ORAL EVERY 30 MIN PRN
Status: DISCONTINUED | OUTPATIENT
Start: 2025-03-21 | End: 2025-03-21 | Stop reason: HOSPADM

## 2025-03-21 RX ORDER — MAGNESIUM SULFATE HEPTAHYDRATE 40 MG/ML
2 INJECTION, SOLUTION INTRAVENOUS
Status: DISCONTINUED | OUTPATIENT
Start: 2025-03-21 | End: 2025-03-21 | Stop reason: HOSPADM

## 2025-03-21 RX ORDER — ACETAMINOPHEN 650 MG/1
650 SUPPOSITORY RECTAL ONCE
Status: COMPLETED | OUTPATIENT
Start: 2025-03-21 | End: 2025-03-21

## 2025-03-21 RX ORDER — SODIUM CHLORIDE, SODIUM LACTATE, POTASSIUM CHLORIDE, CALCIUM CHLORIDE 600; 310; 30; 20 MG/100ML; MG/100ML; MG/100ML; MG/100ML
INJECTION, SOLUTION INTRAVENOUS CONTINUOUS
Status: DISCONTINUED | OUTPATIENT
Start: 2025-03-21 | End: 2025-03-21 | Stop reason: HOSPADM

## 2025-03-21 RX ORDER — CEFAZOLIN SODIUM/WATER 2 G/20 ML
2 SYRINGE (ML) INTRAVENOUS SEE ADMIN INSTRUCTIONS
Status: DISCONTINUED | OUTPATIENT
Start: 2025-03-21 | End: 2025-03-21

## 2025-03-21 RX ORDER — LIDOCAINE 40 MG/G
CREAM TOPICAL
Status: DISCONTINUED | OUTPATIENT
Start: 2025-03-21 | End: 2025-03-21 | Stop reason: HOSPADM

## 2025-03-21 RX ORDER — ALBUTEROL SULFATE 0.83 MG/ML
2.5 SOLUTION RESPIRATORY (INHALATION) EVERY 4 HOURS PRN
Status: DISCONTINUED | OUTPATIENT
Start: 2025-03-21 | End: 2025-03-21 | Stop reason: HOSPADM

## 2025-03-21 RX ORDER — LABETALOL HYDROCHLORIDE 5 MG/ML
10 INJECTION, SOLUTION INTRAVENOUS
Status: DISCONTINUED | OUTPATIENT
Start: 2025-03-21 | End: 2025-03-21 | Stop reason: HOSPADM

## 2025-03-21 RX ORDER — ONDANSETRON 2 MG/ML
4 INJECTION INTRAMUSCULAR; INTRAVENOUS EVERY 30 MIN PRN
Status: DISCONTINUED | OUTPATIENT
Start: 2025-03-21 | End: 2025-03-21 | Stop reason: HOSPADM

## 2025-03-21 RX ORDER — BUPIVACAINE HYDROCHLORIDE 5 MG/ML
INJECTION, SOLUTION EPIDURAL; INTRACAUDAL; PERINEURAL PRN
Status: DISCONTINUED | OUTPATIENT
Start: 2025-03-21 | End: 2025-03-21 | Stop reason: HOSPADM

## 2025-03-21 RX ORDER — METHADONE HYDROCHLORIDE 10 MG/ML
2 INJECTION, SOLUTION INTRAMUSCULAR; INTRAVENOUS; SUBCUTANEOUS 3 TIMES DAILY PRN
Status: DISCONTINUED | OUTPATIENT
Start: 2025-03-21 | End: 2025-03-21 | Stop reason: HOSPADM

## 2025-03-21 RX ORDER — KETOROLAC TROMETHAMINE 15 MG/ML
15 INJECTION, SOLUTION INTRAMUSCULAR; INTRAVENOUS
Status: DISCONTINUED | OUTPATIENT
Start: 2025-03-21 | End: 2025-03-21 | Stop reason: HOSPADM

## 2025-03-21 RX ORDER — OXYCODONE HYDROCHLORIDE 5 MG/1
5-10 TABLET ORAL EVERY 4 HOURS PRN
Qty: 12 TABLET | Refills: 0 | Status: SHIPPED | OUTPATIENT
Start: 2025-03-21

## 2025-03-21 RX ORDER — ACETAMINOPHEN 325 MG/1
975 TABLET ORAL ONCE
Status: COMPLETED | OUTPATIENT
Start: 2025-03-21 | End: 2025-03-21

## 2025-03-21 RX ORDER — IBUPROFEN 200 MG
600 TABLET ORAL EVERY 6 HOURS PRN
COMMUNITY
Start: 2025-03-21

## 2025-03-21 RX ORDER — OXYCODONE HYDROCHLORIDE 5 MG/1
5 TABLET ORAL
Status: COMPLETED | OUTPATIENT
Start: 2025-03-21 | End: 2025-03-21

## 2025-03-21 RX ORDER — CEFAZOLIN SODIUM/WATER 2 G/20 ML
2 SYRINGE (ML) INTRAVENOUS
Status: COMPLETED | OUTPATIENT
Start: 2025-03-21 | End: 2025-03-21

## 2025-03-21 RX ORDER — CEFAZOLIN SODIUM/WATER 2 G/20 ML
2 SYRINGE (ML) INTRAVENOUS SEE ADMIN INSTRUCTIONS
Status: DISCONTINUED | OUTPATIENT
Start: 2025-03-21 | End: 2025-03-21 | Stop reason: HOSPADM

## 2025-03-21 RX ORDER — HYDRALAZINE HYDROCHLORIDE 20 MG/ML
10 INJECTION INTRAMUSCULAR; INTRAVENOUS EVERY 10 MIN PRN
Status: DISCONTINUED | OUTPATIENT
Start: 2025-03-21 | End: 2025-03-21 | Stop reason: HOSPADM

## 2025-03-21 RX ORDER — CEFAZOLIN SODIUM/WATER 2 G/20 ML
2 SYRINGE (ML) INTRAVENOUS
Status: DISCONTINUED | OUTPATIENT
Start: 2025-03-21 | End: 2025-03-21

## 2025-03-21 RX ADMIN — OXYCODONE HYDROCHLORIDE 5 MG: 5 TABLET ORAL at 16:09

## 2025-03-21 RX ADMIN — SODIUM CHLORIDE, SODIUM LACTATE, POTASSIUM CHLORIDE, AND CALCIUM CHLORIDE: .6; .31; .03; .02 INJECTION, SOLUTION INTRAVENOUS at 12:02

## 2025-03-21 RX ADMIN — ACETAMINOPHEN 975 MG: 325 TABLET, FILM COATED ORAL at 11:53

## 2025-03-21 ASSESSMENT — ACTIVITIES OF DAILY LIVING (ADL)
ADLS_ACUITY_SCORE: 15

## 2025-03-21 NOTE — DISCHARGE INSTRUCTIONS
Maximum acetaminophen (Tylenol) dose from all sources should not exceed 4 grams (4000 mg) per day. You last took Tylenol at 11:50 am. You may next take Tylenol at 5:50 pm.     You received Toradol, an IV form of Ibuprofen (Motrin) at 1:00 pm.  Do not take any Ibuprofen products until 7:00 p.m.

## 2025-03-21 NOTE — OP NOTE
SURGEON:  Mehdi Barrera MD     PREOPERATIVE DIAGNOSIS: Request for sterilization     POSTOPERATIVE DIAGNOSIS: Request for sterilization     PROCEDURE PERFORMED:  Robotic-assisted laparoscopic bilateral vasectomy     ANESTHESIA:  General.     COMPLICATIONS:  None.     SPECIMENS:    1.   Bilateral vas deferens     INDICATIONS FOR PROCEDURE: This is a 39-year-old gentleman with 2 children who wishes to have a vasectomy for sterilization.  He is undergoing a robotic assisted laparoscopic bilateral inguinal hernia repair with Dr. Maloney with general surgery today.  He has elected to undergo his vasectomy at the same time as his robotic hernia repair.  He understands the risks of the procedure and he understands that a vasectomy performed in this fashion would not be reversible.    DETAILS OF PROCEDURE: When I entered the room Dr. Maloney had already placed 3 robotic ports in the abdomen and had already developed her peritoneal flaps and identified the vas deferens on each side.  I examined the patient and identified the vas deferens at the level of the inguinal ring on each side.  I placed 2 clips on the vas deferens on each side using the robotic clip applier and cut out a 1 cm segment of each vas deferens between the clips.  These were sent for pathology.  I cauterized the open ends of the vas deferens.  This part of the procedure was concluded.    Dr. Maloney will now complete the patient's inguinal hernia repair.  Please see her dictated report.

## 2025-03-21 NOTE — PROGRESS NOTES
"Patient in phase II for around 2 hours. Unable to urinate. Bladder scanned for 304. Patient stating he would like to go home, and when dicussed with him that he would maybe need a catheter, he stated \"That is not going to happen\". Discussed this with the patient several times. Patient would like to go home. Called on call surgeon, who told me to call urology. Discussed with Dr. Barrera - Dr. Barrera stated to discuss the risks with him again, and see if he can wait a little longer, otherwise, let him go and educate patient on returning to the ER if he has still not been able to urinate. Patient and spouse stated understanding of education given about when to go to the ER. Patient left hospital at 1825.    Georgia Thomas RN on 3/21/2025 at 6:29 PM    "

## 2025-03-21 NOTE — OP NOTE
[facility] General Surgery Operative Note    Pre-operative diagnosis: Right inguinal hernia, possible left   Post-operative diagnosis: Bilateral indirect direct inguinal hernia   Procedure: Robotic assisted laparoscopic bilateral inguinal hernia repair, robotic assisted bilateral vasectomy (performed by Dr. Barrera of urology)   Surgeon: Surekha Maloney MD   Assistant(s): Harriet Sin PA-C  The Physician Assistant was medically necessary for their expertise in prepping, camera management, suctioning, suturing and retraction.   Anesthesia: general   Estimated blood loss:  Specimen:  FINDINGS:  10 ml  Left inguinal cord lipoma, bilateral vas deferens.   Small bilateral indirect inguinal hernias.        Indication for Procedure: This is a 39 year old male who presented to the office with bilateral groin pain.  The hernia was easily palpable on the right and this was confirmed by ultrasound.  He has hernia type symptoms on the left, though hernia is not easily identified on exam and was not visible on ultrasound.  The patient is also desirous of sterilization and has discussed having a vasectomy performed by Dr. Barrera during his inguinal hernia repair.  After discussing risks and benefits, they agreed to proceed with robotic right inguinal repair, possible left.  Description of procedure:  Patient was brought to the operating room, placed on the operating table in supine position. Anesthesia was induced. His pressure points were padded and he was secured with a safety strap. A timeout was called to verify the patient, site of procedure and procedure to be performed.  A 15 mm incision 20cm above the pubis was made and carried down to the fascia. A figure of eight suture of 0 vicryl was placed in the fascia. The abdomen was entered bluntly with a carmalt clamp after making a small nick in the fascia. An 8mm port was placed and the abdomen insufflated with CO2.  Two 8mm ports were then placed on right and left  lateral abdomen, 8cm from the midline port.  There was a small peritoneal indentation on the right in the indirect space.  There were no peritoneal defects on the left.  The peritoneum was opened transversely from the right medial umbilical ligament to the lateral abdominal wall 5cm above the internal ring and ASIS. The peritoneum was dissected away from the abdominal wall from the pubic tubercle medially and laterally toward the iliac crest. The peritoneum was then carefully reduced off of the the cord structures.  A small cord lipoma was reduced from the indirect space on the right and was reduced posteriorly to allow it to lie on the internal surface of the mesh.   Next we proceeded to the left side. The peritoneum was opened transversely from the left medial umbilical ligament to the lateral abdominal wall 5cm above the internal ring and ASIS. The peritoneum was dissected away from the abdominal wall from the pubic tubercle medially and laterally toward the iliac crest. The peritoneum was then carefully reduced off of the the cord structures.  A small cord lipoma was reduced from the indirect space on the left.  This cord lipoma was on a broader base and could not be reduced posteriorly enough to be outside of the mesh.  Therefore we excised the cord lipoma and sent it for permanent pathology.  At this point, the bilateral vas deferens were clearly visible and we turned the case over to Dr. Barrera for bilateral vasectomy.  Please see his dictated operative report for further details.  Once the vasectomy was completed we continued by opening two 10cm x 15cm pieces of progrip mesh and placing them into the prepared preperitoneal pockets.  The mesh was unfurled to lay flat and completely cover the myopectineal orifice, staying above the inferior peritoneal edge. The peritoneum was closed bilaterally in a running fashion with a 3-0 stratafix suture. The cavity was inspected and there was adequate hemostasis. The  trocars were then removed. The previously placed 0 vicryl suture in the supraumbilical fascia was tied down. 0.5% marcaine was injected along the fascia and subcutaneous tissues. The skin was closed with 4-0 suture. Sterile dressings were applied. At the end of the operation, all sponge, instrument, and needle counts were correct.     Surekha Maloney MD

## 2025-03-25 LAB
PATH REPORT.COMMENTS IMP SPEC: NORMAL
PATH REPORT.COMMENTS IMP SPEC: NORMAL
PATH REPORT.FINAL DX SPEC: NORMAL
PATH REPORT.GROSS SPEC: NORMAL
PATH REPORT.MICROSCOPIC SPEC OTHER STN: NORMAL
PATH REPORT.RELEVANT HX SPEC: NORMAL
PHOTO IMAGE: NORMAL

## (undated) DEVICE — LABEL MEDICATION SYSTEM 3303-P

## (undated) DEVICE — ESU GROUND PAD ADULT W/CORD E7507

## (undated) DEVICE — LINEN ORTHO ACL PACK 5447

## (undated) DEVICE — DAVINCI XI ESU FORCE BIPOLAR 8MM 471405

## (undated) DEVICE — SU VICRYL+ 0 27 UR6 VLT VCP603H

## (undated) DEVICE — Device

## (undated) DEVICE — DAVINCI XI SEAL UNIVERSAL 5-12MM 470500

## (undated) DEVICE — GLOVE BIOGEL PI MICRO INDICATOR UNDERGLOVE SZ 6.5 48965

## (undated) DEVICE — DAVINCI XI OBTURATOR BLADELESS 8MM 470359

## (undated) DEVICE — DRSG STERI STRIP 1/2X4" R1547

## (undated) DEVICE — LUBRICANT INST ELECTROLUBE EL101

## (undated) DEVICE — RULER SURGICAL PLASTIC STRL LF CS628

## (undated) DEVICE — CLIP ENDO HEMO-LOC GREEN MED/LG 544230

## (undated) DEVICE — SU STRATAFIX PDS PLUS 3-0 SPIRAL SH 15CM SXPP1B420

## (undated) DEVICE — GLOVE BIOGEL PI MICRO SZ 6.5 48565

## (undated) DEVICE — DAVINCI XI DRAPE ARM 470015

## (undated) DEVICE — VIAL DECANTER STERILE WHITE DYNJDEC06

## (undated) DEVICE — CLIP ENDO HEMO-LOC PURPLE LG 544240

## (undated) DEVICE — BLADE CLIPPER DISP 4406

## (undated) DEVICE — DAVINCI HOT SHEARS TIP COVER  400180

## (undated) DEVICE — SOLUTION IV WATER 1000ML R5000-01

## (undated) DEVICE — GLOVE BIOGEL PI ULTRATOUCH G SZ 7.5 42175

## (undated) DEVICE — DRAPE MAYO STAND 23X54 8337

## (undated) DEVICE — NEEDLE HYPO MONOJECT STANDARD 22GA 1 1/2IN BLUE 1188822112

## (undated) RX ORDER — OXYCODONE HYDROCHLORIDE 5 MG/1
TABLET ORAL
Status: DISPENSED
Start: 2025-03-21

## (undated) RX ORDER — GLYCOPYRROLATE 0.2 MG/ML
INJECTION, SOLUTION INTRAMUSCULAR; INTRAVENOUS
Status: DISPENSED
Start: 2025-03-21

## (undated) RX ORDER — ONDANSETRON 2 MG/ML
INJECTION INTRAMUSCULAR; INTRAVENOUS
Status: DISPENSED
Start: 2025-03-21

## (undated) RX ORDER — PROPOFOL 10 MG/ML
INJECTION, EMULSION INTRAVENOUS
Status: DISPENSED
Start: 2025-03-21

## (undated) RX ORDER — METHADONE HYDROCHLORIDE 10 MG/ML
INJECTION, SOLUTION INTRAMUSCULAR; INTRAVENOUS; SUBCUTANEOUS
Status: DISPENSED
Start: 2025-03-21

## (undated) RX ORDER — LIDOCAINE HYDROCHLORIDE 10 MG/ML
INJECTION, SOLUTION EPIDURAL; INFILTRATION; INTRACAUDAL; PERINEURAL
Status: DISPENSED
Start: 2025-03-21

## (undated) RX ORDER — KETOROLAC TROMETHAMINE 30 MG/ML
INJECTION, SOLUTION INTRAMUSCULAR; INTRAVENOUS
Status: DISPENSED
Start: 2025-03-21

## (undated) RX ORDER — BUPIVACAINE HYDROCHLORIDE 5 MG/ML
INJECTION, SOLUTION EPIDURAL; INTRACAUDAL; PERINEURAL
Status: DISPENSED
Start: 2025-03-21

## (undated) RX ORDER — DEXAMETHASONE SODIUM PHOSPHATE 4 MG/ML
INJECTION, SOLUTION INTRA-ARTICULAR; INTRALESIONAL; INTRAMUSCULAR; INTRAVENOUS; SOFT TISSUE
Status: DISPENSED
Start: 2025-03-21

## (undated) RX ORDER — ACETAMINOPHEN 325 MG/1
TABLET ORAL
Status: DISPENSED
Start: 2025-03-21

## (undated) RX ORDER — CEFAZOLIN SODIUM/WATER 2 G/20 ML
SYRINGE (ML) INTRAVENOUS
Status: DISPENSED
Start: 2025-03-21